# Patient Record
Sex: MALE | Race: NATIVE HAWAIIAN OR OTHER PACIFIC ISLANDER | Employment: FULL TIME | URBAN - NONMETROPOLITAN AREA
[De-identification: names, ages, dates, MRNs, and addresses within clinical notes are randomized per-mention and may not be internally consistent; named-entity substitution may affect disease eponyms.]

---

## 2017-05-26 ENCOUNTER — HOSPITAL ENCOUNTER (OUTPATIENT)
Age: 47
Discharge: HOME | End: 2017-05-26
Payer: COMMERCIAL

## 2017-05-26 DIAGNOSIS — E11.00 UNCONTROLLED TYPE 2 DIABETES MELLITUS WITH HYPEROSMOLARITY WITHOUT COMA, UNSPECIFIED LONG TERM INSULIN USE STATUS: ICD-10-CM

## 2017-05-26 DIAGNOSIS — Z12.5 SCREENING PSA (PROSTATE SPECIFIC ANTIGEN): ICD-10-CM

## 2017-05-26 DIAGNOSIS — Z13.220 SCREENING CHOLESTEROL LEVEL: ICD-10-CM

## 2017-05-26 LAB
ALBUMIN SERPL-MCNC: 4.2 G/DL (ref 3.5–5.2)
ALBUMIN/GLOBULIN RATIO: 1.4 (ref 1–2.5)
ALP BLD-CCNC: 52 U/L (ref 40–129)
ALT SERPL-CCNC: 17 U/L (ref 5–41)
ANION GAP SERPL CALCULATED.3IONS-SCNC: 14 MMOL/L (ref 9–17)
AST SERPL-CCNC: 13 U/L
BILIRUB SERPL-MCNC: 0.43 MG/DL (ref 0.3–1.2)
BUN BLDV-MCNC: 16 MG/DL (ref 6–20)
BUN/CREAT BLD: 31 (ref 9–20)
CALCIUM SERPL-MCNC: 8.9 MG/DL (ref 8.6–10.4)
CHLORIDE BLD-SCNC: 101 MMOL/L (ref 98–107)
CHOLESTEROL/HDL RATIO: 3.7
CHOLESTEROL: 189 MG/DL
CO2: 23 MMOL/L (ref 20–31)
CREAT SERPL-MCNC: 0.52 MG/DL (ref 0.7–1.2)
CREATININE URINE: 125.3 MG/DL (ref 39–259)
ESTIMATED AVERAGE GLUCOSE: 131 MG/DL
GFR AFRICAN AMERICAN: >60 ML/MIN
GFR NON-AFRICAN AMERICAN: >60 ML/MIN
GFR SERPL CREATININE-BSD FRML MDRD: ABNORMAL ML/MIN/{1.73_M2}
GFR SERPL CREATININE-BSD FRML MDRD: ABNORMAL ML/MIN/{1.73_M2}
GLUCOSE BLD-MCNC: 89 MG/DL (ref 70–99)
HBA1C MFR BLD: 6.2 % (ref 4.8–5.9)
HCT VFR BLD CALC: 45.3 % (ref 41–53)
HDLC SERPL-MCNC: 51 MG/DL
HEMOGLOBIN: 15.3 G/DL (ref 13.5–17)
LDL CHOLESTEROL: 108 MG/DL (ref 0–130)
MCH RBC QN AUTO: 29.7 PG (ref 26–34)
MCHC RBC AUTO-ENTMCNC: 33.8 G/DL (ref 31–37)
MCV RBC AUTO: 87.9 FL (ref 80–100)
MICROALBUMIN/CREAT 24H UR: <12 MG/L
MICROALBUMIN/CREAT UR-RTO: 10 MCG/MG CREAT
PDW BLD-RTO: 14 % (ref 12.1–15.2)
PLATELET # BLD: 251 K/UL (ref 140–450)
PMV BLD AUTO: ABNORMAL FL (ref 6–12)
POTASSIUM SERPL-SCNC: 4 MMOL/L (ref 3.7–5.3)
PROSTATE SPECIFIC ANTIGEN: 0.39 UG/L
RBC # BLD: 5.16 M/UL (ref 4.5–5.9)
SODIUM BLD-SCNC: 138 MMOL/L (ref 135–144)
TOTAL PROTEIN: 7.2 G/DL (ref 6.4–8.3)
TRIGL SERPL-MCNC: 148 MG/DL
VLDLC SERPL CALC-MCNC: NORMAL MG/DL (ref 1–30)
WBC # BLD: 11.2 K/UL (ref 3.5–11)

## 2017-05-26 PROCEDURE — 82570 ASSAY OF URINE CREATININE: CPT

## 2017-05-26 PROCEDURE — 80053 COMPREHEN METABOLIC PANEL: CPT

## 2017-05-26 PROCEDURE — 80061 LIPID PANEL: CPT

## 2017-05-26 PROCEDURE — 36415 COLL VENOUS BLD VENIPUNCTURE: CPT

## 2017-05-26 PROCEDURE — 83036 HEMOGLOBIN GLYCOSYLATED A1C: CPT

## 2017-05-26 PROCEDURE — 82043 UR ALBUMIN QUANTITATIVE: CPT

## 2017-05-26 PROCEDURE — 85027 COMPLETE CBC AUTOMATED: CPT

## 2017-05-26 PROCEDURE — G0103 PSA SCREENING: HCPCS

## 2018-02-12 ENCOUNTER — OFFICE VISIT (OUTPATIENT)
Dept: FAMILY MEDICINE CLINIC | Age: 48
End: 2018-02-12
Payer: COMMERCIAL

## 2018-02-12 VITALS
TEMPERATURE: 102.7 F | SYSTOLIC BLOOD PRESSURE: 136 MMHG | WEIGHT: 315 LBS | DIASTOLIC BLOOD PRESSURE: 72 MMHG | BODY MASS INDEX: 44.1 KG/M2 | HEIGHT: 71 IN

## 2018-02-12 DIAGNOSIS — M25.552 PAIN OF LEFT HIP JOINT: ICD-10-CM

## 2018-02-12 DIAGNOSIS — R50.9 FEVER, UNSPECIFIED FEVER CAUSE: Primary | ICD-10-CM

## 2018-02-12 LAB
AVERAGE GLUCOSE: 154
BACTERIA URINE, POC: 0
BASOPHILS ABSOLUTE: 0.1 /ΜL
BASOPHILS RELATIVE PERCENT: 0.8 %
BILIRUBIN URINE: 0 MG/DL
BLOOD, URINE: NEGATIVE
BUN BLDV-MCNC: 10 MG/DL
CALCIUM SERPL-MCNC: 8.8 MG/DL
CASTS URINE, POC: 0
CHLORIDE BLD-SCNC: 91 MMOL/L
CLARITY: CLEAR
CO2: 23 MMOL/L
COLOR: YELLOW
CREAT SERPL-MCNC: 0.8 MG/DL
CRYSTALS URINE, POC: 0
EOSINOPHILS ABSOLUTE: 0 /ΜL
EOSINOPHILS RELATIVE PERCENT: 0.2 %
EPI CELLS URINE, POC: NORMAL
GFR CALCULATED: NORMAL
GLUCOSE BLD-MCNC: 116 MG/DL
GLUCOSE URINE: NORMAL
HBA1C MFR BLD: 7 %
HCT VFR BLD CALC: 43.4 % (ref 41–53)
HEMOGLOBIN: 15.3 G/DL (ref 13.5–17.5)
INFLUENZA A ANTIBODY: NORMAL
INFLUENZA B ANTIBODY: NORMAL
KETONES, URINE: POSITIVE
LEUKOCYTE EST, POC: NORMAL
LYMPHOCYTES ABSOLUTE: 1 /ΜL
LYMPHOCYTES RELATIVE PERCENT: 16.5 %
MCH RBC QN AUTO: 28.9 PG
MCHC RBC AUTO-ENTMCNC: 35.3 G/DL
MCV RBC AUTO: 81.9 FL
MONOCYTES ABSOLUTE: 0.7 /ΜL
MONOCYTES RELATIVE PERCENT: 11.2 %
NEUTROPHILS ABSOLUTE: 4.3 /ΜL
NEUTROPHILS RELATIVE PERCENT: 70.2 %
NITRITE, URINE: NEGATIVE
PDW BLD-RTO: NORMAL %
PH UA: 5 (ref 4.5–8)
PLATELET # BLD: 160 K/ΜL
PMV BLD AUTO: NORMAL FL
POTASSIUM SERPL-SCNC: 3.8 MMOL/L
PROTEIN UA: NEGATIVE
RBC # BLD: 5.3 10^6/ΜL
RBC URINE, POC: 0
SODIUM BLD-SCNC: 132 MMOL/L
SPECIFIC GRAVITY UA: 1 (ref 1–1.03)
UROBILINOGEN, URINE: NORMAL
WBC # BLD: 6.2 10^3/ML
WBC URINE, POC: 0
YEAST URINE, POC: 0

## 2018-02-12 PROCEDURE — 87804 INFLUENZA ASSAY W/OPTIC: CPT | Performed by: FAMILY MEDICINE

## 2018-02-12 PROCEDURE — 81000 URINALYSIS NONAUTO W/SCOPE: CPT | Performed by: FAMILY MEDICINE

## 2018-02-12 PROCEDURE — 99213 OFFICE O/P EST LOW 20 MIN: CPT | Performed by: FAMILY MEDICINE

## 2018-02-12 NOTE — PROGRESS NOTES
TM's.  Nose: nares patent, no lesions. Oral Cavity: mucosa moist.  Throat: palatal erythema  Neck/Thyroid: neck supple, full range of motion, no cervical lymphadenopathy, no thyromegaly or carotid bruits. Skin: warm and dry. No suspicious lesions. Heart: regular rate and rhythm. No murmurs. S1, S2 normal, no gallops. Lungs: clear to auscultation bilaterally. Abdomen: bowel sounds present, soft, nondistended, no masses or organomegaly, morbidly obese, tender mid right abdomen  Musculoskeletal: left pelvic brim tender  Extremities: no cyanosis or edema. Peripheral Pulses: 2+ throughout, symetric. Neurologic: nonfocal, motor strength normal upper and lower extremities, sensory exam intact. Psych: normal affect, speech fluent. ASSESSMENT:  No diagnosis found. PLAN:  We discussed his diabetes, his A1C was 11.3 when I first started seeing him in 2015 and we brought it down to 6. Now he has stopped all his medications. I am unsure of the cause of his fever. I will swab him for influenza and run a urine specimen. No orders of the defined types were placed in this encounter. No orders of the defined types were placed in this encounter. Scribed by: MISTY Mckeon

## 2018-02-13 DIAGNOSIS — I10 ESSENTIAL HYPERTENSION: ICD-10-CM

## 2018-02-13 LAB
ABSOLUTE BASO #: 0.1 K/UL (ref 0–0.1)
ABSOLUTE EOS #: 0 K/UL (ref 0.1–0.4)
ABSOLUTE LYMPH #: 1 K/UL (ref 0.8–5.2)
ABSOLUTE MONO #: 0.7 K/UL (ref 0.1–0.9)
ABSOLUTE NEUT #: 4.3 K/UL (ref 1.3–9.1)
ANION GAP SERPL CALCULATED.3IONS-SCNC: 18 MEQ/L (ref 10–19)
AVERAGE GLUCOSE: 154 MG/DL (ref 66–114)
BASOPHILS RELATIVE PERCENT: 0.8 %
BUN BLDV-MCNC: 10 MG/DL (ref 8–23)
CALCIUM SERPL-MCNC: 8.8 MG/DL (ref 8.5–10.5)
CHLORIDE BLD-SCNC: 91 MEQ/L (ref 95–107)
CO2: 23 MEQ/L (ref 19–31)
CREAT SERPL-MCNC: 0.8 MG/DL (ref 0.8–1.4)
EGFR AFRICAN AMERICAN: 121.6 ML/MIN/1.73 M2
EGFR IF NONAFRICAN AMERICAN: 104.9 ML/MIN/1.73 M2
EOSINOPHILS RELATIVE PERCENT: 0.2 %
GLUCOSE: 116 MG/DL (ref 70–99)
HBA1C MFR BLD: 7 % (ref 4.2–5.8)
HCT VFR BLD CALC: 43.4 % (ref 41.4–51)
HEMOGLOBIN: 15.3 G/DL (ref 13.8–17)
LYMPHOCYTE %: 16.5 %
MCH RBC QN AUTO: 28.9 PG (ref 27–34)
MCHC RBC AUTO-ENTMCNC: 35.3 G/DL (ref 31–36)
MCV RBC AUTO: 81.9 FL (ref 80–100)
MONOCYTES # BLD: 11.2 %
NEUTROPHILS RELATIVE PERCENT: 70.2 %
PDW BLD-RTO: 12.2 % (ref 10.8–14.8)
PLATELETS: 160 K/UL (ref 150–450)
POTASSIUM SERPL-SCNC: 3.8 MEQ/L (ref 3.5–5.4)
RBC: 5.3 M/UL (ref 4–5.5)
SODIUM BLD-SCNC: 132 MEQ/L (ref 135–146)
WBC: 6.2 K/UL (ref 3.7–10.8)

## 2018-02-13 RX ORDER — LISINOPRIL 5 MG/1
TABLET ORAL
Qty: 90 TABLET | Refills: 3 | Status: SHIPPED | OUTPATIENT
Start: 2018-02-13 | End: 2020-05-26 | Stop reason: SDUPTHER

## 2018-02-14 LAB — PROCALCITONIN: 0.22 NG/ML

## 2018-02-28 DIAGNOSIS — R50.9 FEVER, UNSPECIFIED FEVER CAUSE: ICD-10-CM

## 2019-03-05 ENCOUNTER — OFFICE VISIT (OUTPATIENT)
Dept: FAMILY MEDICINE CLINIC | Age: 49
End: 2019-03-05
Payer: COMMERCIAL

## 2019-03-05 ENCOUNTER — HOSPITAL ENCOUNTER (OUTPATIENT)
Age: 49
Discharge: HOME | End: 2019-03-07
Payer: COMMERCIAL

## 2019-03-05 ENCOUNTER — HOSPITAL ENCOUNTER (OUTPATIENT)
Age: 49
Discharge: HOME | End: 2019-03-05
Payer: COMMERCIAL

## 2019-03-05 ENCOUNTER — HOSPITAL ENCOUNTER (OUTPATIENT)
Dept: GENERAL RADIOLOGY | Age: 49
Discharge: HOME | End: 2019-03-07
Payer: COMMERCIAL

## 2019-03-05 ENCOUNTER — TELEPHONE (OUTPATIENT)
Dept: FAMILY MEDICINE CLINIC | Age: 49
End: 2019-03-05

## 2019-03-05 VITALS
HEIGHT: 71 IN | DIASTOLIC BLOOD PRESSURE: 86 MMHG | WEIGHT: 315 LBS | BODY MASS INDEX: 44.1 KG/M2 | SYSTOLIC BLOOD PRESSURE: 138 MMHG

## 2019-03-05 DIAGNOSIS — E11.65 UNCONTROLLED TYPE 2 DIABETES MELLITUS WITH HYPERGLYCEMIA (HCC): ICD-10-CM

## 2019-03-05 DIAGNOSIS — M17.12 OSTEOARTHRITIS OF LEFT KNEE, UNSPECIFIED OSTEOARTHRITIS TYPE: ICD-10-CM

## 2019-03-05 DIAGNOSIS — E66.01 MORBID OBESITY (HCC): ICD-10-CM

## 2019-03-05 DIAGNOSIS — E11.00 DM HYPEROSMOLARITY TYPE II, UNCONTROLLED (HCC): Primary | ICD-10-CM

## 2019-03-05 DIAGNOSIS — I10 ESSENTIAL HYPERTENSION: ICD-10-CM

## 2019-03-05 DIAGNOSIS — E11.65 DM HYPEROSMOLARITY TYPE II, UNCONTROLLED (HCC): Primary | ICD-10-CM

## 2019-03-05 DIAGNOSIS — Z12.5 SCREENING PSA (PROSTATE SPECIFIC ANTIGEN): ICD-10-CM

## 2019-03-05 DIAGNOSIS — M25.562 LEFT KNEE PAIN, UNSPECIFIED CHRONICITY: ICD-10-CM

## 2019-03-05 DIAGNOSIS — E78.5 HYPERLIPIDEMIA, UNSPECIFIED HYPERLIPIDEMIA TYPE: ICD-10-CM

## 2019-03-05 DIAGNOSIS — Z00.00 ROUTINE GENERAL MEDICAL EXAMINATION AT A HEALTH CARE FACILITY: Primary | ICD-10-CM

## 2019-03-05 LAB
ALT SERPL-CCNC: 29 U/L (ref 5–41)
ANION GAP SERPL CALCULATED.3IONS-SCNC: 13 MMOL/L (ref 9–17)
AST SERPL-CCNC: 16 U/L
BUN BLDV-MCNC: 10 MG/DL (ref 6–20)
BUN/CREAT BLD: 17 (ref 9–20)
CALCIUM SERPL-MCNC: 9.3 MG/DL (ref 8.6–10.4)
CHLORIDE BLD-SCNC: 101 MMOL/L (ref 98–107)
CHOLESTEROL/HDL RATIO: 3.5
CHOLESTEROL: 171 MG/DL
CO2: 24 MMOL/L (ref 20–31)
CREAT SERPL-MCNC: 0.6 MG/DL (ref 0.7–1.2)
ESTIMATED AVERAGE GLUCOSE: 131 MG/DL
GFR AFRICAN AMERICAN: >60 ML/MIN
GFR NON-AFRICAN AMERICAN: >60 ML/MIN
GFR SERPL CREATININE-BSD FRML MDRD: ABNORMAL ML/MIN/{1.73_M2}
GFR SERPL CREATININE-BSD FRML MDRD: ABNORMAL ML/MIN/{1.73_M2}
GLUCOSE BLD-MCNC: 101 MG/DL (ref 70–99)
HBA1C MFR BLD: 6.2 % (ref 4.8–5.9)
HCT VFR BLD CALC: 46.8 % (ref 40.7–50.3)
HDLC SERPL-MCNC: 49 MG/DL
HEMOGLOBIN: 15 G/DL (ref 13–17)
HIGH SENSITIVE C-REACTIVE PROTEIN: 11.4 MG/L
LDL CHOLESTEROL: 101 MG/DL (ref 0–130)
MCH RBC QN AUTO: 29.2 PG (ref 25.2–33.5)
MCHC RBC AUTO-ENTMCNC: 32.1 G/DL (ref 28.4–34.8)
MCV RBC AUTO: 91.2 FL (ref 82.6–102.9)
NRBC AUTOMATED: 0 PER 100 WBC
PDW BLD-RTO: 12.8 % (ref 11.8–14.4)
PLATELET # BLD: 268 K/UL (ref 138–453)
PMV BLD AUTO: 9.5 FL (ref 8.1–13.5)
POTASSIUM SERPL-SCNC: 4.1 MMOL/L (ref 3.7–5.3)
RBC # BLD: 5.13 M/UL (ref 4.21–5.77)
SODIUM BLD-SCNC: 138 MMOL/L (ref 135–144)
TRIGL SERPL-MCNC: 106 MG/DL
VLDLC SERPL CALC-MCNC: NORMAL MG/DL (ref 1–30)
WBC # BLD: 12.6 K/UL (ref 3.5–11.3)

## 2019-03-05 PROCEDURE — 99396 PREV VISIT EST AGE 40-64: CPT | Performed by: FAMILY MEDICINE

## 2019-03-05 PROCEDURE — 80061 LIPID PANEL: CPT

## 2019-03-05 PROCEDURE — 84460 ALANINE AMINO (ALT) (SGPT): CPT

## 2019-03-05 PROCEDURE — 84450 TRANSFERASE (AST) (SGOT): CPT

## 2019-03-05 PROCEDURE — 73560 X-RAY EXAM OF KNEE 1 OR 2: CPT

## 2019-03-05 PROCEDURE — 80048 BASIC METABOLIC PNL TOTAL CA: CPT

## 2019-03-05 PROCEDURE — 36415 COLL VENOUS BLD VENIPUNCTURE: CPT

## 2019-03-05 PROCEDURE — 83036 HEMOGLOBIN GLYCOSYLATED A1C: CPT

## 2019-03-05 PROCEDURE — 86141 C-REACTIVE PROTEIN HS: CPT

## 2019-03-05 PROCEDURE — 85027 COMPLETE CBC AUTOMATED: CPT

## 2019-03-05 ASSESSMENT — PATIENT HEALTH QUESTIONNAIRE - PHQ9
SUM OF ALL RESPONSES TO PHQ9 QUESTIONS 1 & 2: 0
SUM OF ALL RESPONSES TO PHQ QUESTIONS 1-9: 0
SUM OF ALL RESPONSES TO PHQ QUESTIONS 1-9: 0
2. FEELING DOWN, DEPRESSED OR HOPELESS: 0
1. LITTLE INTEREST OR PLEASURE IN DOING THINGS: 0

## 2019-05-09 ENCOUNTER — HOSPITAL ENCOUNTER (OUTPATIENT)
Dept: GENERAL RADIOLOGY | Age: 49
Discharge: HOME | End: 2019-05-11
Payer: COMMERCIAL

## 2019-05-09 ENCOUNTER — HOSPITAL ENCOUNTER (OUTPATIENT)
Age: 49
Discharge: HOME | End: 2019-05-11
Payer: COMMERCIAL

## 2019-05-09 ENCOUNTER — HOSPITAL ENCOUNTER (OUTPATIENT)
Age: 49
Discharge: HOME | End: 2019-05-09
Payer: COMMERCIAL

## 2019-05-09 DIAGNOSIS — Z01.811 PRE-OP CHEST EXAM: ICD-10-CM

## 2019-05-09 LAB
ABSOLUTE EOS #: 0.16 K/UL (ref 0–0.44)
ABSOLUTE IMMATURE GRANULOCYTE: 0.08 K/UL (ref 0–0.3)
ABSOLUTE LYMPH #: 2.76 K/UL (ref 1.1–3.7)
ABSOLUTE MONO #: 0.97 K/UL (ref 0.1–1.2)
ANION GAP SERPL CALCULATED.3IONS-SCNC: 12 MMOL/L (ref 9–17)
BASOPHILS # BLD: 1 % (ref 0–2)
BASOPHILS ABSOLUTE: 0.1 K/UL (ref 0–0.2)
BUN BLDV-MCNC: 11 MG/DL (ref 6–20)
BUN/CREAT BLD: 18 (ref 9–20)
CALCIUM SERPL-MCNC: 9.2 MG/DL (ref 8.6–10.4)
CHLORIDE BLD-SCNC: 100 MMOL/L (ref 98–107)
CO2: 26 MMOL/L (ref 20–31)
CREAT SERPL-MCNC: 0.6 MG/DL (ref 0.7–1.2)
DIFFERENTIAL TYPE: ABNORMAL
EKG ATRIAL RATE: 85 BPM
EKG P AXIS: 65 DEGREES
EKG P-R INTERVAL: 144 MS
EKG Q-T INTERVAL: 382 MS
EKG QRS DURATION: 96 MS
EKG QTC CALCULATION (BAZETT): 454 MS
EKG R AXIS: -20 DEGREES
EKG T AXIS: 30 DEGREES
EKG VENTRICULAR RATE: 85 BPM
EOSINOPHILS RELATIVE PERCENT: 1 % (ref 1–4)
GFR AFRICAN AMERICAN: >60 ML/MIN
GFR NON-AFRICAN AMERICAN: >60 ML/MIN
GFR SERPL CREATININE-BSD FRML MDRD: ABNORMAL ML/MIN/{1.73_M2}
GFR SERPL CREATININE-BSD FRML MDRD: ABNORMAL ML/MIN/{1.73_M2}
GLUCOSE BLD-MCNC: 122 MG/DL (ref 70–99)
HCT VFR BLD CALC: 46.4 % (ref 40.7–50.3)
HEMOGLOBIN: 15.4 G/DL (ref 13–17)
IMMATURE GRANULOCYTES: 1 %
LYMPHOCYTES # BLD: 20 % (ref 24–43)
MCH RBC QN AUTO: 29.1 PG (ref 25.2–33.5)
MCHC RBC AUTO-ENTMCNC: 33.2 G/DL (ref 28.4–34.8)
MCV RBC AUTO: 87.5 FL (ref 82.6–102.9)
MONOCYTES # BLD: 7 % (ref 3–12)
NRBC AUTOMATED: 0 PER 100 WBC
PDW BLD-RTO: 13.1 % (ref 11.8–14.4)
PLATELET # BLD: 274 K/UL (ref 138–453)
PLATELET ESTIMATE: ABNORMAL
PMV BLD AUTO: 8.9 FL (ref 8.1–13.5)
POTASSIUM SERPL-SCNC: 4.5 MMOL/L (ref 3.7–5.3)
RBC # BLD: 5.3 M/UL (ref 4.21–5.77)
RBC # BLD: ABNORMAL 10*6/UL
SEG NEUTROPHILS: 70 % (ref 36–65)
SEGMENTED NEUTROPHILS ABSOLUTE COUNT: 9.59 K/UL (ref 1.5–8.1)
SODIUM BLD-SCNC: 138 MMOL/L (ref 135–144)
WBC # BLD: 13.7 K/UL (ref 3.5–11.3)
WBC # BLD: ABNORMAL 10*3/UL

## 2019-05-09 PROCEDURE — 71046 X-RAY EXAM CHEST 2 VIEWS: CPT

## 2019-05-09 PROCEDURE — 85025 COMPLETE CBC W/AUTO DIFF WBC: CPT

## 2019-05-09 PROCEDURE — 93005 ELECTROCARDIOGRAM TRACING: CPT

## 2019-05-09 PROCEDURE — 80048 BASIC METABOLIC PNL TOTAL CA: CPT

## 2019-05-09 PROCEDURE — 36415 COLL VENOUS BLD VENIPUNCTURE: CPT

## 2019-07-16 ENCOUNTER — HOSPITAL ENCOUNTER (OUTPATIENT)
Age: 49
Discharge: HOME | End: 2019-07-16
Payer: COMMERCIAL

## 2019-07-16 ENCOUNTER — TELEPHONE (OUTPATIENT)
Dept: GASTROENTEROLOGY | Age: 49
End: 2019-07-16

## 2019-07-16 ENCOUNTER — OFFICE VISIT (OUTPATIENT)
Dept: FAMILY MEDICINE CLINIC | Age: 49
End: 2019-07-16
Payer: COMMERCIAL

## 2019-07-16 VITALS
BODY MASS INDEX: 44.1 KG/M2 | HEIGHT: 71 IN | SYSTOLIC BLOOD PRESSURE: 136 MMHG | DIASTOLIC BLOOD PRESSURE: 82 MMHG | WEIGHT: 315 LBS

## 2019-07-16 DIAGNOSIS — M17.12 OSTEOARTHRITIS OF LEFT KNEE, UNSPECIFIED OSTEOARTHRITIS TYPE: ICD-10-CM

## 2019-07-16 DIAGNOSIS — E11.00 DM HYPEROSMOLARITY TYPE II, UNCONTROLLED (HCC): ICD-10-CM

## 2019-07-16 DIAGNOSIS — E66.01 MORBID OBESITY (HCC): ICD-10-CM

## 2019-07-16 DIAGNOSIS — B07.9 FILIFORM WART: Primary | ICD-10-CM

## 2019-07-16 DIAGNOSIS — F41.9 INSOMNIA SECONDARY TO ANXIETY: ICD-10-CM

## 2019-07-16 DIAGNOSIS — F41.9 ANXIETY: ICD-10-CM

## 2019-07-16 DIAGNOSIS — Z12.5 SCREENING PSA (PROSTATE SPECIFIC ANTIGEN): ICD-10-CM

## 2019-07-16 DIAGNOSIS — E78.5 HYPERLIPIDEMIA, UNSPECIFIED HYPERLIPIDEMIA TYPE: ICD-10-CM

## 2019-07-16 DIAGNOSIS — F51.05 INSOMNIA SECONDARY TO ANXIETY: ICD-10-CM

## 2019-07-16 DIAGNOSIS — E11.65 DM HYPEROSMOLARITY TYPE II, UNCONTROLLED (HCC): ICD-10-CM

## 2019-07-16 DIAGNOSIS — E11.9 TYPE 2 DIABETES MELLITUS WITHOUT COMPLICATION, UNSPECIFIED WHETHER LONG TERM INSULIN USE (HCC): ICD-10-CM

## 2019-07-16 LAB
ANION GAP SERPL CALCULATED.3IONS-SCNC: 12 MMOL/L (ref 9–17)
BUN BLDV-MCNC: 12 MG/DL (ref 6–20)
BUN/CREAT BLD: 18 (ref 9–20)
CALCIUM SERPL-MCNC: 9 MG/DL (ref 8.6–10.4)
CHLORIDE BLD-SCNC: 104 MMOL/L (ref 98–107)
CO2: 24 MMOL/L (ref 20–31)
CREAT SERPL-MCNC: 0.67 MG/DL (ref 0.7–1.2)
ESTIMATED AVERAGE GLUCOSE: 137 MG/DL
GFR AFRICAN AMERICAN: >60 ML/MIN
GFR NON-AFRICAN AMERICAN: >60 ML/MIN
GFR SERPL CREATININE-BSD FRML MDRD: ABNORMAL ML/MIN/{1.73_M2}
GFR SERPL CREATININE-BSD FRML MDRD: ABNORMAL ML/MIN/{1.73_M2}
GLUCOSE BLD-MCNC: 103 MG/DL (ref 70–99)
HBA1C MFR BLD: 6.4 % (ref 4.8–5.9)
HBA1C MFR BLD: 6.5 %
POTASSIUM SERPL-SCNC: 4.5 MMOL/L (ref 3.7–5.3)
PROSTATE SPECIFIC ANTIGEN: 0.63 UG/L
SODIUM BLD-SCNC: 140 MMOL/L (ref 135–144)

## 2019-07-16 PROCEDURE — 80048 BASIC METABOLIC PNL TOTAL CA: CPT

## 2019-07-16 PROCEDURE — 83036 HEMOGLOBIN GLYCOSYLATED A1C: CPT

## 2019-07-16 PROCEDURE — 36415 COLL VENOUS BLD VENIPUNCTURE: CPT

## 2019-07-16 PROCEDURE — G0103 PSA SCREENING: HCPCS

## 2019-07-16 PROCEDURE — 99214 OFFICE O/P EST MOD 30 MIN: CPT | Performed by: FAMILY MEDICINE

## 2019-07-16 PROCEDURE — 83036 HEMOGLOBIN GLYCOSYLATED A1C: CPT | Performed by: FAMILY MEDICINE

## 2019-07-16 PROCEDURE — 17110 DESTRUCTION B9 LES UP TO 14: CPT | Performed by: FAMILY MEDICINE

## 2019-07-16 RX ORDER — TRAZODONE HYDROCHLORIDE 50 MG/1
TABLET ORAL
Qty: 90 TABLET | Refills: 0 | Status: SHIPPED | OUTPATIENT
Start: 2019-07-16 | End: 2020-05-26 | Stop reason: ALTCHOICE

## 2019-07-16 RX ORDER — METFORMIN HYDROCHLORIDE 500 MG/1
1000 TABLET, EXTENDED RELEASE ORAL
Qty: 180 TABLET | Refills: 3 | Status: SHIPPED | OUTPATIENT
Start: 2019-07-16 | End: 2020-05-26 | Stop reason: SDUPTHER

## 2019-07-16 RX ORDER — ROSUVASTATIN CALCIUM 5 MG/1
5 TABLET, COATED ORAL DAILY
Qty: 90 TABLET | Refills: 3 | Status: SHIPPED | OUTPATIENT
Start: 2019-07-16 | End: 2020-05-26 | Stop reason: SDUPTHER

## 2019-07-16 NOTE — PROGRESS NOTES
strip TEST 2 TIMES DAILY. Patient not taking: Reported on 7/16/2019 7/8/16   Isabel Ware MD   Blood Glucose Monitoring Suppl (TRUE METRIX AIR GLUCOSE METER) W/DEVICE KIT 1 Device by Does not apply route 2 times daily  Patient not taking: Reported on 7/16/2019 7/8/16   Isabel Ware MD     ROS:  General Constitutional: Denies chills. Denies fever. Denies headache. Denies lightheadedness. Ophthalmologic: Denies blurred vision. ENT: Denies nasal congestion. Denies sore throat. Denies ear pain and pressure. Respiratory: Denies cough. Denies shortness of breath. Denies wheezing. Cardiovascular: Denies chest pain at rest. Denies irregular heartbeat. Denies palpitations. Gastrointestinal: Denies abdominal pain. Denies blood in the stool. Denies constipation. Denies diarrhea. Denies nausea. Denies vomiting. Genitourinary: Denies blood in the urine. Denies difficulty urinating. Denies frequent urination. Denies painful urination. Denies urinary incontinence. Musculoskeletal: Denies muscle aches. Denies painful joints. Denies swollen joints. Admits L knee pain, saw an orthopedic and was told he'd need a replacement 3 years or lose 150 #  Peripheral Vascular: Denies pain/cramping in legs after exertion. Skin: Denies dry skin. Denies itching. Denies rash. Neurologic: Denies falls. Denies dizziness. Denies fainting. Denies tingling/numbness. Psychiatric: Denies depressed mood. Admits troubles falling asleep, takes 2-3 hours to fall asleep. Admits anxiety, has troubles shutting his mind off, states he's a worry wart. No past surgical history on file.     Family History   Problem Relation Age of Onset    High Blood Pressure Mother     High Cholesterol Mother     Emphysema Mother     Stroke Mother     Diabetes Father 48    Cancer Father     Other Sister         Smoker, chronic cough, SOB, 300+ lbs    Diabetes Paternal Uncle         Type 1    Cancer Paternal Uncle         Prostate    Heart Attack Paternal Last 3 Encounters:   07/16/19 136/82   03/05/19 138/86   02/12/18 136/72     General Appearance: in no acute distress, well developed, well nourished. Eyes: pupils equal, round reactive to light and accommodation. Ears: normal canal and TM's. Nose: nares patent, no lesions. Oral Cavity: mucosa moist.  Throat: clear. narrow  Neck/Thyroid: neck supple, full range of motion, no cervical lymphadenopathy, no thyromegaly or carotid bruits. Skin: warm and dry. No suspicious lesions. Hemosiderin deposits, Filiform wart L ear canal  Heart: regular rate and rhythm. No murmurs. S1, S2 normal, no gallops. Rate 65  Lungs: inspiratory and expiratory wheeze LLL  Abdomen: bowel sounds present, soft, nontender, nondistended, no masses or organomegaly. Morbidly obese  Musculoskeletal: normal, full range of motion in knees and hips, no swelling or tenderness. Extremities: no cyanosis, 1+ edema BLE  Peripheral Pulses: 2+ throughout, symetric. Neurologic: nonfocal, motor strength normal upper and lower extremities, sensory exam intact. Psych: normal affect, speech fluent. ASSESSMENT:   Diagnosis Orders   1. Filiform wart  80447 - ND DESTRUC PREMALIGNANT, FIRST LESION   2. Insomnia secondary to anxiety     3. Osteoarthritis of left knee, unspecified osteoarthritis type     4. Type 2 diabetes mellitus without complication, unspecified whether long term insulin use (HCC)  POCT glycosylated hemoglobin (Hb A1C)   5. Hyperlipidemia, unspecified hyperlipidemia type     6. Morbid obesity ([address]     7. Anxiety         PLAN:  I would like for him to start taking Metformin XR 1,000 mg once daily. With having diabetes this gives him the same risk of having a heart attack or stroke as a patient with known heart disease. I will start him on 5 mg Rosuvastatin. I will check an A1C today in office. I also will start him on Trazodone 50 mg for 2 weeks before bed and then increase to 100 mg. He states his goal is to get under 300 lbs.

## 2019-07-17 NOTE — TELEPHONE ENCOUNTER
What is the indication for an EGD on this patient?
at bedtime for 2 weeks, then increase to 2 tablets  Oswaldo Travis MD   diclofenac sodium 1 % GEL Apply 2 g topically 2 times daily  Oswaldo Travis MD   lisinopril (PRINIVIL;ZESTRIL) 5 MG tablet TAKE 1 TABLET BY MOUTH DAILY  Oswaldo Travis MD   glimepiride (AMARYL) 1 MG tablet 1/2 tablet twice a day  Patient not taking: Reported on 7/16/2019  Oswaldo Travis MD   [employer] Lancets Ultra Thin MISC USE TO TEST 3 TIMES A DAY  Patient not taking: Reported on 7/16/2019  Oswaldo Travis MD   glucose blood VI test strips (TRUE METRIX BLOOD GLUCOSE TEST) strip TEST 2 TIMES DAILY.   Patient not taking: Reported on 7/16/2019  Oswaldo Travis MD   Blood Glucose Monitoring Suppl (TRUE METRIX AIR GLUCOSE METER) W/DEVICE KIT 1 Device by Does not apply route 2 times daily  Patient not taking: Reported on 7/16/2019  Oswaldo Travis MD         Electronically signed by Oswaldo Travis MD on 7/16/19 at 8:40 AM

## 2020-05-26 ENCOUNTER — OFFICE VISIT (OUTPATIENT)
Dept: FAMILY MEDICINE CLINIC | Age: 50
End: 2020-05-26
Payer: COMMERCIAL

## 2020-05-26 VITALS — WEIGHT: 315 LBS | HEIGHT: 71 IN | BODY MASS INDEX: 44.1 KG/M2

## 2020-05-26 PROBLEM — M54.16 LUMBAR RADICULOPATHY: Status: ACTIVE | Noted: 2020-05-26

## 2020-05-26 PROCEDURE — 99213 OFFICE O/P EST LOW 20 MIN: CPT | Performed by: FAMILY MEDICINE

## 2020-05-26 RX ORDER — CYCLOBENZAPRINE HCL 10 MG
10 TABLET ORAL NIGHTLY PRN
Qty: 30 TABLET | Refills: 0 | Status: SHIPPED | OUTPATIENT
Start: 2020-05-26 | End: 2020-06-05

## 2020-05-26 RX ORDER — LISINOPRIL 5 MG/1
TABLET ORAL
Qty: 90 TABLET | Refills: 3 | Status: SHIPPED | OUTPATIENT
Start: 2020-05-26 | End: 2021-07-13 | Stop reason: SDUPTHER

## 2020-05-26 RX ORDER — ROSUVASTATIN CALCIUM 5 MG/1
5 TABLET, COATED ORAL DAILY
Qty: 90 TABLET | Refills: 3 | Status: SHIPPED | OUTPATIENT
Start: 2020-05-26 | End: 2021-07-13 | Stop reason: SDUPTHER

## 2020-05-26 RX ORDER — METFORMIN HYDROCHLORIDE 500 MG/1
1000 TABLET, EXTENDED RELEASE ORAL
Qty: 180 TABLET | Refills: 3 | Status: SHIPPED | OUTPATIENT
Start: 2020-05-26 | End: 2021-07-13 | Stop reason: SDUPTHER

## 2020-05-26 NOTE — PROGRESS NOTES
normal canal and TM's. Nose: nares patent, no lesions. Oral Cavity: mucosa moist.  Throat: clear. Neck/Thyroid: neck supple, full range of motion, no cervical lymphadenopathy, no thyromegaly or carotid bruits. Skin: warm and dry. No suspicious lesions. Heart: regular rate and rhythm. No murmurs. S1, S2 normal, no gallops. Lungs: clear to auscultation bilaterally. Abdomen: bowel sounds present, soft, nontender, nondistended, no masses or organomegaly. Morbid obesity  Musculoskeletal: normal, full range of motion in knees and hips, no swelling or tenderness. ROM in the spine with flexion was about 40 degrees, extension to about 15 degrees, lateral rotation and flexion was normal. Positive straight leg test on the RLE. Steady gait. Extremities: no cyanosis or edema. Peripheral Pulses: 2+ throughout, symetric. Neurologic: nonfocal, motor strength normal upper and lower extremities, sensory exam intact. Psych: normal affect, speech fluent. ASSESSMENT:   Diagnosis Orders   1. Lumbar radiculopathy  [facility]    L4   2. Essential hypertension  lisinopril (PRINIVIL;ZESTRIL) 5 MG tablet         PLAN:    Discussed with the patient this is correlating with L4 radiculopathy and that there is no urgency in this with needing diagnostic testing. Educated on the patient that we should have him be off of work for 3 weeks and to go to Physical Therapy during this time to see if there is improvement. Also, discussed that he can take PRN flexeril at night to help with spasms. I would like to see the patient in 3 weeks to determine if there was effectiveness or not with this treatment.      Orders Placed This Encounter   Procedures    [facility]     Referral Priority:   Routine     Referral Type:   Eval and Treat     Referral Reason:   Specialty Services Required     Requested Specialty:   Physical Therapy     Number of Visits Requested:   1     Orders Placed This Encounter

## 2020-05-26 NOTE — PATIENT INSTRUCTIONS
PLAN:    Discussed with the patient this is correlating with L4-5 impingement    SURVEY:    You may be receiving a survey from [employer] regarding your visit today. Please complete the survey to enable us to provide the highest quality of care to you and your family. If you cannot score us a very good on any question, please call the office to discuss how we could have made your experience a very good one. Thank you.

## 2020-05-28 ENCOUNTER — HOSPITAL ENCOUNTER (OUTPATIENT)
Dept: PHYSICAL THERAPY | Age: 50
Setting detail: THERAPIES SERIES
Discharge: HOME | End: 2020-05-28
Payer: COMMERCIAL

## 2020-05-28 PROCEDURE — 97140 MANUAL THERAPY 1/> REGIONS: CPT

## 2020-05-28 PROCEDURE — G0283 ELEC STIM OTHER THAN WOUND: HCPCS

## 2020-05-28 PROCEDURE — 97110 THERAPEUTIC EXERCISES: CPT

## 2020-05-28 PROCEDURE — 97162 PT EVAL MOD COMPLEX 30 MIN: CPT

## 2020-12-03 ENCOUNTER — TELEPHONE (OUTPATIENT)
Dept: FAMILY MEDICINE CLINIC | Age: 50
End: 2020-12-03

## 2020-12-03 ASSESSMENT — PATIENT HEALTH QUESTIONNAIRE - PHQ9
SUM OF ALL RESPONSES TO PHQ9 QUESTIONS 1 & 2: 0
2. FEELING DOWN, DEPRESSED OR HOPELESS: 0
SUM OF ALL RESPONSES TO PHQ QUESTIONS 1-9: 0
1. LITTLE INTEREST OR PLEASURE IN DOING THINGS: 0

## 2020-12-03 NOTE — TELEPHONE ENCOUNTER
PATIENT CALLED IN REGARDS TO CHECKING IN ON MOOD STABILITY AND DEPRESSION. DEPRESSION SCREENING  COMPLETED.

## 2020-12-16 ENCOUNTER — TELEPHONE (OUTPATIENT)
Dept: FAMILY MEDICINE CLINIC | Age: 50
End: 2020-12-16

## 2020-12-16 NOTE — TELEPHONE ENCOUNTER
Pt sent in a message asking about a coivd test. I lm for him to call the office to go over what is going on??

## 2021-01-08 ENCOUNTER — HOSPITAL ENCOUNTER (OUTPATIENT)
Dept: GENERAL RADIOLOGY | Age: 51
Discharge: HOME | End: 2021-01-10
Payer: COMMERCIAL

## 2021-01-08 ENCOUNTER — HOSPITAL ENCOUNTER (OUTPATIENT)
Age: 51
Discharge: HOME | End: 2021-01-10
Payer: COMMERCIAL

## 2021-01-08 DIAGNOSIS — M25.512 LEFT SHOULDER PAIN, UNSPECIFIED CHRONICITY: ICD-10-CM

## 2021-01-08 DIAGNOSIS — M79.622 PAIN OF LEFT UPPER ARM: ICD-10-CM

## 2021-01-08 PROCEDURE — 73030 X-RAY EXAM OF SHOULDER: CPT

## 2021-01-08 PROCEDURE — 73060 X-RAY EXAM OF HUMERUS: CPT

## 2021-01-29 DIAGNOSIS — I10 ESSENTIAL HYPERTENSION: ICD-10-CM

## 2021-01-29 RX ORDER — METFORMIN HYDROCHLORIDE 500 MG/1
1000 TABLET, EXTENDED RELEASE ORAL
Qty: 180 TABLET | Refills: 3 | OUTPATIENT
Start: 2021-01-29

## 2021-01-29 RX ORDER — LISINOPRIL 5 MG/1
TABLET ORAL
Qty: 90 TABLET | Refills: 3 | OUTPATIENT
Start: 2021-01-29

## 2021-01-29 RX ORDER — ROSUVASTATIN CALCIUM 5 MG/1
5 TABLET, COATED ORAL DAILY
Qty: 90 TABLET | Refills: 3 | OUTPATIENT
Start: 2021-01-29

## 2021-02-02 ENCOUNTER — HOSPITAL ENCOUNTER (OUTPATIENT)
Dept: MRI IMAGING | Age: 51
Discharge: HOME | End: 2021-02-04
Payer: COMMERCIAL

## 2021-02-02 DIAGNOSIS — M25.512 LEFT SHOULDER PAIN, UNSPECIFIED CHRONICITY: ICD-10-CM

## 2021-02-15 ENCOUNTER — HOSPITAL ENCOUNTER (OUTPATIENT)
Dept: MRI IMAGING | Age: 51
Discharge: HOME | End: 2021-02-17
Payer: COMMERCIAL

## 2021-02-15 PROCEDURE — 73221 MRI JOINT UPR EXTREM W/O DYE: CPT

## 2021-07-13 ENCOUNTER — OFFICE VISIT (OUTPATIENT)
Dept: FAMILY MEDICINE CLINIC | Age: 51
End: 2021-07-13
Payer: COMMERCIAL

## 2021-07-13 VITALS
SYSTOLIC BLOOD PRESSURE: 158 MMHG | WEIGHT: 315 LBS | OXYGEN SATURATION: 96 % | BODY MASS INDEX: 44.1 KG/M2 | HEIGHT: 71 IN | DIASTOLIC BLOOD PRESSURE: 90 MMHG

## 2021-07-13 DIAGNOSIS — I10 ESSENTIAL HYPERTENSION: ICD-10-CM

## 2021-07-13 DIAGNOSIS — Z12.11 SCREENING FOR COLON CANCER: Primary | ICD-10-CM

## 2021-07-13 DIAGNOSIS — E11.9 TYPE 2 DIABETES MELLITUS WITHOUT COMPLICATION, UNSPECIFIED WHETHER LONG TERM INSULIN USE (HCC): ICD-10-CM

## 2021-07-13 DIAGNOSIS — E66.01 MORBID OBESITY (HCC): ICD-10-CM

## 2021-07-13 DIAGNOSIS — F51.05 INSOMNIA SECONDARY TO ANXIETY: ICD-10-CM

## 2021-07-13 DIAGNOSIS — F41.9 INSOMNIA SECONDARY TO ANXIETY: ICD-10-CM

## 2021-07-13 LAB — HBA1C MFR BLD: 13.5 %

## 2021-07-13 PROCEDURE — 99214 OFFICE O/P EST MOD 30 MIN: CPT | Performed by: FAMILY MEDICINE

## 2021-07-13 PROCEDURE — 83036 HEMOGLOBIN GLYCOSYLATED A1C: CPT | Performed by: FAMILY MEDICINE

## 2021-07-13 RX ORDER — METFORMIN HYDROCHLORIDE 500 MG/1
1000 TABLET, EXTENDED RELEASE ORAL 2 TIMES DAILY
Qty: 360 TABLET | Refills: 3 | Status: SHIPPED | OUTPATIENT
Start: 2021-07-13 | End: 2022-10-17 | Stop reason: SDUPTHER

## 2021-07-13 RX ORDER — TRAMADOL HYDROCHLORIDE 50 MG/1
50 TABLET ORAL EVERY 6 HOURS PRN
COMMUNITY
End: 2022-01-18 | Stop reason: ALTCHOICE

## 2021-07-13 RX ORDER — LISINOPRIL 5 MG/1
TABLET ORAL
Qty: 90 TABLET | Refills: 3 | Status: SHIPPED | OUTPATIENT
Start: 2021-07-13 | End: 2022-05-24 | Stop reason: SDUPTHER

## 2021-07-13 RX ORDER — ROSUVASTATIN CALCIUM 5 MG/1
5 TABLET, COATED ORAL DAILY
Qty: 90 TABLET | Refills: 3 | Status: SHIPPED | OUTPATIENT
Start: 2021-07-13 | End: 2022-05-24 | Stop reason: SDUPTHER

## 2021-07-13 ASSESSMENT — PATIENT HEALTH QUESTIONNAIRE - PHQ9
1. LITTLE INTEREST OR PLEASURE IN DOING THINGS: 0
SUM OF ALL RESPONSES TO PHQ9 QUESTIONS 1 & 2: 0
SUM OF ALL RESPONSES TO PHQ QUESTIONS 1-9: 0
DEPRESSION UNABLE TO ASSESS: PT REFUSES
SUM OF ALL RESPONSES TO PHQ QUESTIONS 1-9: 0
SUM OF ALL RESPONSES TO PHQ QUESTIONS 1-9: 0
2. FEELING DOWN, DEPRESSED OR HOPELESS: 0

## 2021-07-13 NOTE — PATIENT INSTRUCTIONS
PLAN:       A1C in office-13.5. I would like him to go back on metformin 1000 mg twice daily and lisinopril 5 mg daily. We discuss the difficulty falling asleep. He has tried tramadol 5 mg melatonin, and trazodone without success. Given his risk for sleep apnea, I can't give him a sedative as it could increase his risk for stopping breathing. He does drink diet pepsi throughout the day. I recommend that he eliminate his caffeine intake by 1 caffeinated beverage per day, per week until he has discontinued this and then increase the melatonin to 10 mg nightly. He is to get labs done- CBC,  A1C, PSA, Lipids, BMP through [facility] in 1 month. I will see him back in 1 month. SURVEY:    You may be receiving a survey from [employer] regarding your visit today. Please complete the survey to enable us to provide the highest quality of care to you and your family. If you cannot score us a very good on any question, please call the office to discuss how we could of made your experience a very good one. Thank you.

## 2021-07-13 NOTE — PROGRESS NOTES
Apply 2 g topically 2 times daily 3/5/19   Giles Bermudez MD       ROS:  General Constitutional: Denies chills. Denies fever. admits headache. Admits lightheadedness. Ophthalmologic: Denies blurred vision. ENT: Denies nasal congestion. Denies sore throat. Denies ear pain and pressure. Respiratory: Denies cough. Denies shortness of breath. Denies wheezing. Cardiovascular: Denies chest pain at rest. Denies irregular heartbeat. Denies palpitations. Gastrointestinal: Denies abdominal pain. Denies blood in the stool. Admits constipation. Admits diarrhea. Denies nausea. Denies vomiting. Admits to only eating 1 meal a day   Genitourinary: Denies blood in the urine. Denies difficulty urinating. Denies frequent urination. Denies painful urination. Denies urinary incontinence. Musculoskeletal: Denies muscle aches. admits painful joints knees, ankles, hips. Denies swollen joints. Admits aches in rotator cuff   Peripheral Vascular: Denies pain/cramping in legs after exertion. Skin: Denies dry skin. Denies itching. Denies rash. Neurologic: admits falls at work in January also 6 weeks ago fishing slipped had chest pain x3 weeks and admits to hitting head . Admits dizziness with lack of food in system. Denies fainting. admits tingling/numbness in legs   Psychiatric: admits sleep disturbance. admits anxiety. Admits depressed mood. No past surgical history on file.     Family History   Problem Relation Age of Onset    High Blood Pressure Mother     High Cholesterol Mother     Emphysema Mother     Stroke Mother     Diabetes Father 48    Cancer Father     Other Sister         Smoker, chronic cough, SOB, 300+ lbs    Diabetes Paternal Uncle         Type 1    Cancer Paternal Uncle         Prostate    Heart Attack Paternal Grandfather     Diabetes Paternal Grandfather     Cataracts Paternal Grandfather        Past Medical History:   Diagnosis Date    Diabetes [facility] [address] 2015    Hypertension     Obesity     Morbid    Venous insufficiency       Social History     Tobacco Use    Smoking status: Never Smoker    Smokeless tobacco: Never Used   Substance Use Topics    Alcohol use: Yes     Alcohol/week: 0.0 standard drinks     Comment: occ, beer 1-2x/month      Current Outpatient Medications   Medication Sig Dispense Refill    traMADol (ULTRAM) 50 MG tablet Take 50 mg by mouth every 6 hours as needed for Pain.  lisinopril (PRINIVIL;ZESTRIL) 5 MG tablet TAKE 1 TABLET BY MOUTH DAILY 90 tablet 3    metFORMIN (GLUCOPHAGE-XR) 500 MG extended release tablet Take 2 tablets by mouth daily (with breakfast) (Patient not taking: Reported on 7/13/2021) 180 tablet 3    rosuvastatin (CRESTOR) 5 MG tablet Take 1 tablet by mouth daily (Patient not taking: Reported on 7/13/2021) 90 tablet 3    diclofenac sodium 1 % GEL Apply 2 g topically 2 times daily 1 Tube 3     No current facility-administered medications for this visit. No Known Allergies    PHYSICAL EXAM:    BP (!) 158/90   Ht 5' 11\" (1.803 m)   Wt (!) 395 lb (179.2 kg)   SpO2 96%   BMI 55.09 kg/m²   Wt Readings from Last 3 Encounters:   07/13/21 (!) 395 lb (179.2 kg)   05/26/20 (!) 415 lb 9.6 oz (188.5 kg)   07/16/19 (!) 395 lb (179.2 kg)     BP Readings from Last 3 Encounters:   07/13/21 (!) 158/90   07/16/19 136/82   03/05/19 138/86       General Appearance: in no acute distress, Obese   Eyes: pupils equal, round reactive to light and accommodation. Wears glasses  Ears: normal canal and TM's. Nose: nares patent, no lesions. Oral Cavity: mucosa moist.  Throat: clear. Neck/Thyroid: neck supple, full range of motion, no cervical lymphadenopathy, no thyromegaly or carotid bruits. Skin: warm and dry. No suspicious lesions. Heart: regular rate and rhythm. No murmurs. S1, S2 normal, no gallops. Rate 80  Lungs: clear to auscultation bilaterally. Abdomen: bowel sounds present, soft, nontender, nondistended, no masses or organomegaly.    Musculoskeletal: normal, full range of motion in knees and hips, no swelling or tenderness. Extremities: no cyanosis or edema. Peripheral Pulses: 2+ throughout, symetric. Neurologic: nonfocal, motor strength normal upper and lower extremities, sensory exam intact. Psych: normal affect, speech fluent. ASSESSMENT:   Diagnosis Orders   1. Screening for colon cancer  POCT Fecal Immunochemical Test (FIT)   2. Type 2 diabetes mellitus without complication, unspecified whether long term insulin use ([address]     3. Morbid obesity ([facility] [address] [facility]     4. Essential hypertension         PLAN:       A1C in office-13.5. I would like him to go back on metformin 1000 mg twice daily and lisinopril 5 mg daily. We discuss the difficulty falling asleep. He has tried tramadol 5 mg melatonin, and trazodone without success. Given his risk for sleep apnea, I can't give him a sedative as it could increase his risk for stopping breathing. He does drink diet pepsi throughout the day. I recommend that he eliminate his caffeine intake by 1 caffeinated beverage per day, per week until he has discontinued this and then increase the melatonin to 10 mg nightly. He is to get labs done- CBC,  A1C, PSA, Lipids, BMP through [facility] in 1 month. I will see him back in 1 month. Orders Placed This Encounter   Procedures    POCT Fecal Immunochemical Test (FIT)     Standing Status:   Future     Standing Expiration Date:   7/13/2022     No orders of the defined types were placed in this encounter.

## 2021-08-11 DIAGNOSIS — I10 ESSENTIAL HYPERTENSION: ICD-10-CM

## 2021-08-11 DIAGNOSIS — Z00.00 ROUTINE GENERAL MEDICAL EXAMINATION AT A HEALTH CARE FACILITY: ICD-10-CM

## 2021-08-11 DIAGNOSIS — E78.5 HYPERLIPIDEMIA, UNSPECIFIED HYPERLIPIDEMIA TYPE: ICD-10-CM

## 2021-08-11 DIAGNOSIS — E11.9 TYPE 2 DIABETES MELLITUS WITHOUT COMPLICATION, UNSPECIFIED WHETHER LONG TERM INSULIN USE (HCC): Primary | ICD-10-CM

## 2021-08-11 DIAGNOSIS — Z12.5 SCREENING PSA (PROSTATE SPECIFIC ANTIGEN): ICD-10-CM

## 2021-08-12 ENCOUNTER — OFFICE VISIT (OUTPATIENT)
Dept: FAMILY MEDICINE CLINIC | Age: 51
End: 2021-08-12
Payer: COMMERCIAL

## 2021-08-12 VITALS
BODY MASS INDEX: 44.1 KG/M2 | HEIGHT: 71 IN | HEART RATE: 86 BPM | RESPIRATION RATE: 19 BRPM | SYSTOLIC BLOOD PRESSURE: 132 MMHG | WEIGHT: 315 LBS | DIASTOLIC BLOOD PRESSURE: 86 MMHG | OXYGEN SATURATION: 96 %

## 2021-08-12 DIAGNOSIS — R53.83 OTHER FATIGUE: ICD-10-CM

## 2021-08-12 DIAGNOSIS — Z12.11 SCREENING FOR COLON CANCER: ICD-10-CM

## 2021-08-12 DIAGNOSIS — E11.9 TYPE 2 DIABETES MELLITUS WITHOUT COMPLICATION, UNSPECIFIED WHETHER LONG TERM INSULIN USE (HCC): Primary | ICD-10-CM

## 2021-08-12 DIAGNOSIS — R19.7 DIARRHEA, UNSPECIFIED TYPE: ICD-10-CM

## 2021-08-12 DIAGNOSIS — E66.01 MORBID OBESITY (HCC): ICD-10-CM

## 2021-08-12 LAB
ABSOLUTE BASO #: 0.1 X10E9/L (ref 0–0.2)
ABSOLUTE EOS #: 0.1 X10E9/L (ref 0–0.4)
ABSOLUTE LYMPH #: 1.9 X10E9/L (ref 1–3.5)
ABSOLUTE MONO #: 0.7 X10E9/L (ref 0–0.9)
ABSOLUTE NEUT #: 6.3 X10E9/L (ref 1.5–6.6)
ALT SERPL-CCNC: 64 U/L (ref 0–40)
ANION GAP SERPL CALCULATED.3IONS-SCNC: 12 MMOL/L (ref 5–15)
AST SERPL-CCNC: 34 U/L (ref 0–41)
AVERAGE GLUCOSE: 269 MG/DL
BASOPHILS RELATIVE PERCENT: 0.6 %
BUN BLDV-MCNC: 6 MG/DL (ref 5–23)
CALCIUM SERPL-MCNC: 8.9 MG/DL (ref 8.5–10.5)
CHLORIDE BLD-SCNC: 101 MMOL/L (ref 98–109)
CHOLESTEROL/HDL RATIO: 2.6 (ref 1–5)
CHOLESTEROL: 110 MG/DL (ref 150–200)
CO2: 22 MMOL/L (ref 22–32)
CREAT SERPL-MCNC: 0.6 MG/DL (ref 0.6–1.3)
EGFR AFRICAN AMERICAN: >60 ML/MIN/1.73SQ.M
EGFR IF NONAFRICAN AMERICAN: >60 ML/MIN/1.73SQ.M
EOSINOPHILS RELATIVE PERCENT: 0.8 %
GLUCOSE: 167 MG/DL (ref 65–99)
HBA1C MFR BLD: 11 % (ref 4.4–6.4)
HCT VFR BLD CALC: 47 % (ref 39–49)
HDLC SERPL-MCNC: 42 MG/DL
HEMOGLOBIN: 15.8 G/DL (ref 13–17)
LDL CHOLESTEROL CALCULATED: 52 MG/DL
LDL/HDL RATIO: 1.2
LYMPHOCYTE %: 20.9 %
MCH RBC QN AUTO: 29.3 PG (ref 27–34)
MCHC RBC AUTO-ENTMCNC: 33.7 G/DL (ref 32–36)
MCV RBC AUTO: 87 FL (ref 80–100)
MONOCYTES # BLD: 7.5 %
NEUTROPHILS RELATIVE PERCENT: 70.2 %
PDW BLD-RTO: 12.8 % (ref 11.5–15)
PLATELETS: 249 X10E9/L (ref 150–450)
PMV BLD AUTO: 8.6 FL (ref 7–12)
POTASSIUM SERPL-SCNC: 4.5 MMOL/L (ref 3.5–5)
PSA, ULTRASENSITIVE: 0.21 NG/ML (ref 0–4)
RBC: 5.42 X10E12/L (ref 4.1–5.7)
SODIUM BLD-SCNC: 135 MMOL/L (ref 134–146)
T4 TOTAL: 7.8 UG/DL (ref 6.1–12.2)
TRIGL SERPL-MCNC: 79 MG/DL (ref 27–150)
TSH SERPL DL<=0.05 MIU/L-ACNC: 2.34 UIU/ML (ref 0.49–4.67)
VLDLC SERPL CALC-MCNC: 16 MG/DL (ref 0–30)
WBC: 8.9 X10E9/L (ref 4–11)

## 2021-08-12 PROCEDURE — 99214 OFFICE O/P EST MOD 30 MIN: CPT | Performed by: NURSE PRACTITIONER

## 2021-08-12 RX ORDER — PIOGLITAZONEHYDROCHLORIDE 15 MG/1
15 TABLET ORAL DAILY
Qty: 30 TABLET | Refills: 3 | Status: SHIPPED
Start: 2021-08-12 | End: 2021-08-24 | Stop reason: ALTCHOICE

## 2021-08-12 SDOH — ECONOMIC STABILITY: FOOD INSECURITY: WITHIN THE PAST 12 MONTHS, YOU WORRIED THAT YOUR FOOD WOULD RUN OUT BEFORE YOU GOT MONEY TO BUY MORE.: NEVER TRUE

## 2021-08-12 SDOH — ECONOMIC STABILITY: FOOD INSECURITY: WITHIN THE PAST 12 MONTHS, THE FOOD YOU BOUGHT JUST DIDN'T LAST AND YOU DIDN'T HAVE MONEY TO GET MORE.: NEVER TRUE

## 2021-08-12 ASSESSMENT — PATIENT HEALTH QUESTIONNAIRE - PHQ9
SUM OF ALL RESPONSES TO PHQ QUESTIONS 1-9: 2
1. LITTLE INTEREST OR PLEASURE IN DOING THINGS: 1
2. FEELING DOWN, DEPRESSED OR HOPELESS: 1
SUM OF ALL RESPONSES TO PHQ9 QUESTIONS 1 & 2: 2

## 2021-08-12 ASSESSMENT — SOCIAL DETERMINANTS OF HEALTH (SDOH): HOW HARD IS IT FOR YOU TO PAY FOR THE VERY BASICS LIKE FOOD, HOUSING, MEDICAL CARE, AND HEATING?: NOT HARD AT ALL

## 2021-08-12 NOTE — PROGRESS NOTES
Name: Michael Ladd  : 1970         Chief Complaint:     Chief Complaint   Patient presents with    Diabetes     4 week f/u, he reports his BS running 150-220, he does report fatigue, he reports he has had a sleep study in the past but does not have a CPAP machine, he reports urinating around 3 times nightly, and has difficulty falling asleep. History of Present Illness:      Michael Ladd is a 46 y.o.  male who presents with Diabetes (4 week f/u, he reports his BS running 150-220, he does report fatigue, he reports he has had a sleep study in the past but does not have a CPAP machine, he reports urinating around 3 times nightly, and has difficulty falling asleep.)      HPI     DM:  Attending diabetes education: No  Diabetic diet/low carb diet compliance: Denies low sugar/low carb diet  Current exercise: Walking 30 minutes daily  Frequency of exercise: 3 times per week  Frequency of glucose testing at home: QHS and twice daily  Home blood sugar records: 150-220 (fasting)  Glucometer at home: Yes  History of hypoglycemic episodes: no  Last eye exam: 2019  Current symptoms: Denies excessive hunger or excessive thirst. Denies numbness/tingling in hands or feet or major changes in weight. Denies sores that are slow to heal. Admits increased frequency of urination and fatigue. Admits changing vision. reports that he has never smoked. He has never used smokeless tobacco.   Medication compliance:  compliant all of the time  Medication Therapy: metformin 1000mg BID  Hemoglobin A1C (%)   Date Value   2021 13.5   2019 6.4 (H)   2019 6.5   2019 6.2 (H)   2018 7.0 (H)      Fatigue:  Admits fatigue. Sleep apnea link study completed 2016 showed abnormalities. He was encouraged to lose weight at this time to assist with symptoms.      Past Medical History:     Past Medical History:   Diagnosis Date    Diabetes ([address] 2015    Hypertension     Obesity     Morbid    Venous insufficiency       Reviewed all health maintenance requirements and ordered appropriate tests  Health Maintenance Due   Topic Date Due    Colon cancer screen colonoscopy  Never done    Diabetic microalbuminuria test  05/26/2018    Lipid screen  03/05/2020    Potassium monitoring  07/16/2020    Creatinine monitoring  07/16/2020       Past Surgical History:     No past surgical history on file. Medications:       Prior to Admission medications    Medication Sig Start Date End Date Taking? Authorizing Provider   pioglitazone (ACTOS) 15 MG tablet Take 1 tablet by mouth daily 8/12/21  Yes GLENDY Jorge CNP   metFORMIN (GLUCOPHAGE-XR) 500 MG extended release tablet Take 2 tablets by mouth 2 times daily 7/13/21  Yes Eboni Sanabria MD   lisinopril (PRINIVIL;ZESTRIL) 5 MG tablet TAKE 1 TABLET BY MOUTH DAILY 7/13/21  Yes Eboni Sanabria MD   rosuvastatin (CRESTOR) 5 MG tablet Take 1 tablet by mouth daily 7/13/21  Yes Eboni Sanabria MD   traMADol (ULTRAM) 50 MG tablet Take 50 mg by mouth every 6 hours as needed for Pain. Patient not taking: Reported on 8/12/2021    Historical Provider, MD   diclofenac sodium 1 % GEL Apply 2 g topically 2 times daily 3/5/19   Eboni Sanabria MD        Allergies:       Patient has no known allergies. Social History:     Tobacco:    reports that he has never smoked. He has never used smokeless tobacco.  Alcohol:      reports current alcohol use. Drug Use:  reports no history of drug use.     Family History:     Family History   Problem Relation Age of Onset    High Blood Pressure Mother     High Cholesterol Mother     Emphysema Mother     Stroke Mother     Diabetes Father 48    Cancer Father     Other Sister         Smoker, chronic cough, SOB, 300+ lbs    Diabetes Paternal Uncle         Type 1    Cancer Paternal Uncle         Prostate    Heart Attack Paternal Grandfather     Diabetes Paternal Grandfather     Cataracts Paternal Grandfather        Review of Systems:     Positive and Negative as described in HPI    Review of Systems   Constitutional: Positive for fatigue. Endocrine: Positive for polyuria. Negative for polydipsia and polyphagia. Physical Exam:   Vitals:  /86 (Site: Left Upper Arm, Position: Sitting, Cuff Size: Large Adult)   Pulse 86   Resp 19   Ht 5' 11\" (1.803 m)   Wt (!) 389 lb 6.4 oz (176.6 kg)   SpO2 96%   BMI 54.31 kg/m²     Physical Exam  Constitutional:       General: He is not in acute distress. Appearance: Normal appearance. He is obese. He is not ill-appearing or toxic-appearing. Cardiovascular:      Rate and Rhythm: Normal rate and regular rhythm. Heart sounds: Normal heart sounds. No murmur heard. Pulmonary:      Effort: Pulmonary effort is normal. No respiratory distress. Breath sounds: Normal breath sounds. No stridor. No wheezing, rhonchi or rales. Neurological:      Mental Status: He is alert. Psychiatric:         Mood and Affect: Mood normal.         Behavior: Behavior normal.         Thought Content:  Thought content normal.         Judgment: Judgment normal.         Data:     Lab Results   Component Value Date     07/16/2019    K 4.5 07/16/2019     07/16/2019    CO2 24 07/16/2019    BUN 12 07/16/2019    CREATININE 0.67 07/16/2019    GLUCOSE 103 07/16/2019    GLUCOSE 116 02/12/2018    PROT 7.2 05/26/2017    LABALBU 4.2 05/26/2017    BILITOT 0.43 05/26/2017    ALKPHOS 52 05/26/2017    AST 16 03/05/2019    ALT 29 03/05/2019     Lab Results   Component Value Date    WBC 13.7 05/09/2019    RBC 5.30 05/09/2019    RBC 5.30 02/12/2018    HGB 15.4 05/09/2019    HCT 46.4 05/09/2019    MCV 87.5 05/09/2019    MCH 29.1 05/09/2019    MCHC 33.2 05/09/2019    RDW 13.1 05/09/2019     05/09/2019    MPV 8.9 05/09/2019     No results found for: TSH  Lab Results   Component Value Date    CHOL 171 03/05/2019    HDL 49 03/05/2019    PSA 0.63 07/16/2019    LABA1C 13.5 07/13/2021 Assessment/Plan:      Diagnosis Orders   1. Type 2 diabetes mellitus without complication, unspecified whether long term insulin use ([facility] [address]     2. Screening for colon cancer  [facility]   3. Diarrhea, unspecified type     4. Morbid obesity ([facility] [address]     5. Other fatigue         DM:   Counseled on diabetes education, patient declines  Recommended annual diabetic eye exam  Recommended nutritionist, patient declines  Counseled at length on diabetic diet and routine physical activity  Discussed goals of weight loss in the treatment of DM. Patient states he is considering a consultation with [place] weight loss center. Will not repeat A1c at this time due to concerns of insurance coverage. We will plan to repeat A1c at next visit in 2 months. Based on fasting glucose numbers, will add Actos 15 mg. Reviewed side effects. Continue Metformin 1000 mg twice daily. Diarrheal symptoms have greatly improved, per patient  Notify office if symptoms worsen or persist    Fatigue:  Recommended repeat sleep apnea test, patient declines  Counseled on weight loss to assist with likely JUAN MANUEL    Wellness:  Patient is due for colorectal cancer screening. Discussed concerns with irregular bowel habits as well as possible hemorrhoids. Patient agreeable to colonoscopy. Referral to [address] [place] [address] placed today. Completed Refills   Requested Prescriptions     Signed Prescriptions Disp Refills    pioglitazone (ACTOS) 15 MG tablet 30 tablet 3     Sig: Take 1 tablet by mouth daily       Orders Placed This Encounter   Procedures   [facility]     Referral Priority:   Routine     Referral Type:   Eval and Treat     Referral Reason:   Specialty Services Required     Requested Specialty:   General Surgery     Number of Visits Requested:   1        No results found for this visit on 08/12/21.     Return in about 2 months (around 10/12/2021), or if symptoms worsen or fail to improve, for DM f/u with POC A1c + anxiety + GI.     Electronically signed by GLENDY Barnett CNP on 08/12/21 at 3:54 PM.

## 2021-08-12 NOTE — PATIENT INSTRUCTIONS
SURVEY:    You may be receiving a survey from [employer] regarding your visit today. Please complete the survey to enable us to provide the highest quality of care to you and your family. If you cannot score us a very good on any question, please call the office to discuss how we could have made your experience a very good one. Thank you.

## 2021-08-16 ENCOUNTER — TELEPHONE (OUTPATIENT)
Dept: FAMILY MEDICINE CLINIC | Age: 51
End: 2021-08-16

## 2021-08-16 NOTE — TELEPHONE ENCOUNTER
Please notify patient to discontinue actos. Monitor fasting glucose x1 week and call office with readings.

## 2021-08-16 NOTE — TELEPHONE ENCOUNTER
Patient states he was able to get Actos. He is worried about how the medication is making him feel. He states he feels very fatigue and \"funny in the head\".

## 2021-08-24 ENCOUNTER — PATIENT MESSAGE (OUTPATIENT)
Dept: FAMILY MEDICINE CLINIC | Age: 51
End: 2021-08-24

## 2021-08-24 RX ORDER — PIOGLITAZONEHYDROCHLORIDE 30 MG/1
30 TABLET ORAL DAILY
Qty: 30 TABLET | Refills: 3 | Status: SHIPPED
Start: 2021-08-24 | End: 2021-08-24 | Stop reason: SINTOL

## 2021-08-24 RX ORDER — EMPAGLIFLOZIN 10 MG/1
1 TABLET, FILM COATED ORAL DAILY
Qty: 30 TABLET | Refills: 2 | Status: SHIPPED
Start: 2021-08-24 | End: 2022-01-18

## 2021-08-24 NOTE — TELEPHONE ENCOUNTER
From: Charleen Kocher  To: Nighat Cole, APRN - CNP  Sent: 8/24/2021 9:12 AM EDT  Subject: Non-Urgent Medical Question    Hi just wanted to give you a week of my fasting blood glucose readings. 8/18 221. 8/19 209. 8/20 187. 8/21 194. 8/22 178. 8/23 144. 8/24 149.

## 2021-08-24 NOTE — TELEPHONE ENCOUNTER
Notify patient to increase Actos from 15mg QD to 30mg QD. Updated rx sent to pharmacy. Call office/drop off readings in 4 weeks with glucose averages for review. Keep 10/2021 DM f/u with Dr. Lisa Paniagua.

## 2021-10-13 ENCOUNTER — OFFICE VISIT (OUTPATIENT)
Dept: FAMILY MEDICINE CLINIC | Age: 51
End: 2021-10-13
Payer: COMMERCIAL

## 2021-10-13 VITALS
SYSTOLIC BLOOD PRESSURE: 134 MMHG | DIASTOLIC BLOOD PRESSURE: 82 MMHG | BODY MASS INDEX: 44.1 KG/M2 | OXYGEN SATURATION: 97 % | WEIGHT: 315 LBS | HEIGHT: 71 IN

## 2021-10-13 DIAGNOSIS — R60.9 WATER RETENTION: ICD-10-CM

## 2021-10-13 DIAGNOSIS — E11.9 TYPE 2 DIABETES MELLITUS WITHOUT COMPLICATION, UNSPECIFIED WHETHER LONG TERM INSULIN USE (HCC): Primary | ICD-10-CM

## 2021-10-13 DIAGNOSIS — E66.01 MORBID OBESITY (HCC): ICD-10-CM

## 2021-10-13 LAB — HBA1C MFR BLD: 10.6 %

## 2021-10-13 PROCEDURE — 99214 OFFICE O/P EST MOD 30 MIN: CPT | Performed by: FAMILY MEDICINE

## 2021-10-13 PROCEDURE — 83036 HEMOGLOBIN GLYCOSYLATED A1C: CPT | Performed by: FAMILY MEDICINE

## 2021-10-13 RX ORDER — HYDROCHLOROTHIAZIDE 25 MG/1
25 TABLET ORAL EVERY MORNING
Qty: 90 TABLET | Refills: 1 | Status: SHIPPED | OUTPATIENT
Start: 2021-10-13 | End: 2022-03-28 | Stop reason: SDUPTHER

## 2021-10-13 RX ORDER — PIOGLITAZONEHYDROCHLORIDE 45 MG/1
45 TABLET ORAL DAILY
Qty: 90 TABLET | Refills: 1 | Status: SHIPPED
Start: 2021-10-13 | End: 2021-10-26

## 2021-10-13 RX ORDER — PIOGLITAZONEHYDROCHLORIDE 15 MG/1
15 TABLET ORAL DAILY
COMMUNITY
End: 2021-10-13 | Stop reason: DRUGHIGH

## 2021-10-13 NOTE — PROGRESS NOTES
I, Carla Ríos [facility], am scribing for and in the presence of Dr. Miguelito Hdz. 10/13/21/4:20/CRF    [address] [address]  [place] [id number]  Dept: [phone]    Justina Thomson is a 46 y.o. male here for Diabetes and Anxiety    - pt provides log of blood sugar - fasting 120-200 , states after meals is in the same range   -pt states he can press into his calf and it stays for some time   -legs / feet go numb and swollen with long periods of sitting  - questions weight gain/ water retention with ACTOS 15 mg     HPI:  DIABETES MELLITUS TYPE II  Medication compliance: noncompliant: ran out of medication since he did not have insurance  Diabetic diet compliance: compliant most of the time  Current exercise: no regular exercise  Frequency of testing: none   Any episodes of hypoglycemia? No  Eye exam current (within one year): no  Diabetic foot check in the past year: yes, 3/2019   reports that he has never smoked. He has never used smokeless tobacco.      HYPERTENSION  No regular exercising and is adherent to a low-salt diet. Blood pressure is being monitored at home, 140/90s. Cardiac symptoms: none. Patient denies: chest pain, irregular heart beat and palpitations    Prior to Admission medications    Medication Sig Start Date End Date Taking?  Authorizing Provider   pioglitazone (ACTOS) 45 MG tablet Take 1 tablet by mouth daily 10/13/21  Yes Miguelito Hdz MD   hydroCHLOROthiazide (HYDRODIURIL) 25 MG tablet Take 1 tablet by mouth every morning 10/13/21  Yes Miguelito Hdz MD   metFORMIN (GLUCOPHAGE-XR) 500 MG extended release tablet Take 2 tablets by mouth 2 times daily 7/13/21  Yes Miguelito Hdz MD   lisinopril (PRINIVIL;ZESTRIL) 5 MG tablet TAKE 1 TABLET BY MOUTH DAILY 7/13/21  Yes Miguelito Hdz MD   rosuvastatin (CRESTOR) 5 MG tablet Take 1 tablet by mouth daily 7/13/21  Yes Miguelito Hdz MD   empagliflozin (JARDIANCE) 10 MG tablet Take 1 tablet by mouth daily  Patient not taking: Reported on 10/13/2021 8/24/21   GLENDY Isaacs - CNP   traMADol (ULTRAM) 50 MG tablet Take 50 mg by mouth every 6 hours as needed for Pain. Patient not taking: Reported on 8/12/2021    Historical Provider, MD   diclofenac sodium 1 % GEL Apply 2 g topically 2 times daily 3/5/19   Kyleigh Guidry MD       ROS:  General Constitutional: admits chills. Denies fever. admits headache and lightheadedness. Admits bloody nose at hs with cough   Ophthalmologic: Denies blurred vision. ENT: admits nasal drainage. admits sore throat. Denies ear pain and pressure. Respiratory: admits cough for past 6 weeks. admits shortness of breath. admits wheezing. Cardiovascular: admits chest pain with cough. Denies irregular heartbeat. Denies palpitations. Gastrointestinal: Denies abdominal pain. Denies blood in the stool. admits constipation. admits diarrhea. Denies nausea. Denies vomiting. Genitourinary: Denies blood in the urine. Denies difficulty urinating. Denies frequent urination. Denies painful urination. Denies urinary incontinence. Musculoskeletal: Denies muscle aches. Denies painful joints. Denies swollen joints. Peripheral Vascular: admits pain/cramping in legs in the evening  Skin: Denies dry skin. Denies itching. Denies rash. Neurologic: Denies falls. Denies dizziness. Denies fainting. admits tingling/numbness in feet with extended periods of sitting   Psychiatric: admits sleep disturbance ,sleep well after alcohol , wakes up often. Admits anxiety and depressed mood. No past surgical history on file.     Family History   Problem Relation Age of Onset    High Blood Pressure Mother     High Cholesterol Mother     Emphysema Mother     Stroke Mother     Diabetes Father 48    Cancer Father     Other Sister         Smoker, chronic cough, SOB, 300+ lbs    Diabetes Paternal Uncle         Type 1    Cancer Paternal Uncle         Prostate    Heart Attack Paternal [place] Diabetes Paternal Grandfather     Cataracts Paternal Grandfather        Past Medical History:   Diagnosis Date    Diabetes ([facility] [address] 2015    Hypertension     Obesity     Morbid    Venous insufficiency       Social History     Tobacco Use    Smoking status: Never Smoker    Smokeless tobacco: Never Used   Substance Use Topics    Alcohol use: Yes     Alcohol/week: 0.0 standard drinks     Comment: occ, beer 1-2x/month      Current Outpatient Medications   Medication Sig Dispense Refill    pioglitazone (ACTOS) 45 MG tablet Take 1 tablet by mouth daily 90 tablet 1    hydroCHLOROthiazide (HYDRODIURIL) 25 MG tablet Take 1 tablet by mouth every morning 90 tablet 1    metFORMIN (GLUCOPHAGE-XR) 500 MG extended release tablet Take 2 tablets by mouth 2 times daily 360 tablet 3    lisinopril (PRINIVIL;ZESTRIL) 5 MG tablet TAKE 1 TABLET BY MOUTH DAILY 90 tablet 3    rosuvastatin (CRESTOR) 5 MG tablet Take 1 tablet by mouth daily 90 tablet 3    empagliflozin (JARDIANCE) 10 MG tablet Take 1 tablet by mouth daily (Patient not taking: Reported on 10/13/2021) 30 tablet 2    traMADol (ULTRAM) 50 MG tablet Take 50 mg by mouth every 6 hours as needed for Pain. (Patient not taking: Reported on 8/12/2021)      diclofenac sodium 1 % GEL Apply 2 g topically 2 times daily 1 Tube 3     No current facility-administered medications for this visit. No Known Allergies    PHYSICAL EXAM:    /82   Ht 5' 11\" (1.803 m)   Wt (!) 403 lb 9.6 oz (183.1 kg)   SpO2 97%   BMI 56.29 kg/m²   Wt Readings from Last 3 Encounters:   10/13/21 (!) 403 lb 9.6 oz (183.1 kg)   08/12/21 (!) 389 lb 6.4 oz (176.6 kg)   07/13/21 (!) 395 lb (179.2 kg)     BP Readings from Last 3 Encounters:   10/13/21 134/82   08/12/21 132/86   07/13/21 (!) 158/90       General Appearance: in no acute distress, well developed, well nourished. Eyes: pupils equal, round reactive to light and accommodation. Ears: normal canal and TM's.   Nose: nares patent, no lesions. Oral Cavity: mucosa moist.  Throat: clear. Neck/Thyroid: neck supple, full range of motion, no cervical lymphadenopathy, no thyromegaly or carotid bruits. Skin: warm and dry. No suspicious lesions. Heart: regular rate and rhythm. No murmurs. S1, S2 normal, no gallops. Rate 80  Lungs: clear to auscultation bilaterally. Abdomen: bowel sounds present, soft, nontender, nondistended, no masses or organomegaly. Round morbidly obese  Musculoskeletal: normal, full range of motion in knees and hips, no swelling or tenderness. Extremities: no cyanosis or edema. Statis changes 1+ edema   Peripheral Pulses: 2+ throughout, symetric. Neurologic: nonfocal, motor strength normal upper and lower extremities, sensory exam intact. Psych: normal affect, speech fluent. ASSESSMENT:   Diagnosis Orders   1. Type 2 diabetes mellitus without complication, unspecified whether long term insulin use (HCC)  POCT glycosylated hemoglobin (Hb A1C)    pioglitazone (ACTOS) 45 MG tablet   2. Water retention  hydroCHLOROthiazide (HYDRODIURIL) 25 MG tablet   3. Morbid obesity ([address]         PLAN:  I get an A1C in office today and it resulted in 10.6, which is slightly improved as his last A1C was 11.0. I would like to increase his Actos 15 mg to Actos 45 mg. I will also send in Hydrochlorothiazide 25 mg for water retention. He is going to a seminar in [facility] this month for weight loss , and information on surgery for a mesh sleeve. We discuss weight loss. We discuss his diet, he states he eats a salad before his main meals and I encourage him to use a vinaigrette as his dressing instead of ranch. He has the mentality to do this he just needs to be more strict with himself. 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    We started wellbutrin XL 150mg qam for 2 weeks then 300mg qam    I will see him back in  3 months     Orders Placed This Encounter   Procedures    POCT glycosylated hemoglobin (Hb A1C)     Orders Placed This Encounter   Medications    pioglitazone (ACTOS) 45 MG tablet     Sig: Take 1 tablet by mouth daily     Dispense:  90 tablet     Refill:  1    hydroCHLOROthiazide (HYDRODIURIL) 25 MG tablet     Sig: Take 1 tablet by mouth every morning     Dispense:  90 tablet     Refill:  1   I, Dr. Radha Page, personally performed the services described in this documentation as scribed/transcribed by STEPHAN Cárdenas in my presence, and is both accurate and complete.

## 2021-10-15 ENCOUNTER — TELEPHONE (OUTPATIENT)
Dept: FAMILY MEDICINE CLINIC | Age: 51
End: 2021-10-15

## 2021-10-15 DIAGNOSIS — F41.8 ANXIETY WITH DEPRESSION: Primary | ICD-10-CM

## 2021-10-15 RX ORDER — BUPROPION HYDROCHLORIDE 150 MG/1
150 TABLET ORAL EVERY MORNING
Qty: 14 TABLET | Refills: 0 | Status: SHIPPED
Start: 2021-10-15 | End: 2022-01-18

## 2021-10-15 RX ORDER — BUPROPION HYDROCHLORIDE 300 MG/1
300 TABLET ORAL EVERY MORNING
Qty: 90 TABLET | Refills: 0 | Status: SHIPPED | OUTPATIENT
Start: 2021-10-15 | End: 2022-01-11

## 2021-10-15 NOTE — TELEPHONE ENCOUNTER
Per verbal order , send in Wellbutrin XL (150 mg for 2 weeks and increase to 300 mg )    Pt is agreeable , sent to [facility]

## 2021-10-26 RX ORDER — SEMAGLUTIDE 1.34 MG/ML
0.25 INJECTION, SOLUTION SUBCUTANEOUS WEEKLY
Qty: 3 PEN | Refills: 0 | Status: SHIPPED | OUTPATIENT
Start: 2021-10-26 | End: 2022-02-15

## 2021-10-26 NOTE — TELEPHONE ENCOUNTER
Insurance would not cover actos. Dr. Tony Cho gave orders to try ozempic instead as this is preferred. Pt. Daysi Richard. Rx sent.

## 2021-11-03 ENCOUNTER — TELEPHONE (OUTPATIENT)
Dept: BARIATRICS/WEIGHT MGMT | Age: 51
End: 2021-11-03

## 2021-11-03 NOTE — TELEPHONE ENCOUNTER
Patient attended [facility] session on 10/27, notified not a covered benefit with plan.  Discussed private pay and nonsurgical

## 2022-01-11 DIAGNOSIS — F41.8 ANXIETY WITH DEPRESSION: ICD-10-CM

## 2022-01-11 RX ORDER — BUPROPION HYDROCHLORIDE 300 MG/1
TABLET ORAL
Qty: 90 TABLET | Refills: 0 | Status: SHIPPED
Start: 2022-01-11 | End: 2022-01-18

## 2022-01-18 ENCOUNTER — OFFICE VISIT (OUTPATIENT)
Dept: FAMILY MEDICINE CLINIC | Age: 52
End: 2022-01-18
Payer: COMMERCIAL

## 2022-01-18 VITALS
HEIGHT: 71 IN | BODY MASS INDEX: 44.1 KG/M2 | DIASTOLIC BLOOD PRESSURE: 76 MMHG | WEIGHT: 315 LBS | SYSTOLIC BLOOD PRESSURE: 118 MMHG

## 2022-01-18 DIAGNOSIS — E11.9 TYPE 2 DIABETES MELLITUS WITHOUT COMPLICATION, UNSPECIFIED WHETHER LONG TERM INSULIN USE (HCC): Primary | ICD-10-CM

## 2022-01-18 DIAGNOSIS — F41.8 ANXIETY WITH DEPRESSION: ICD-10-CM

## 2022-01-18 DIAGNOSIS — F51.05 INSOMNIA SECONDARY TO ANXIETY: ICD-10-CM

## 2022-01-18 DIAGNOSIS — E66.01 MORBID OBESITY (HCC): ICD-10-CM

## 2022-01-18 DIAGNOSIS — F41.9 INSOMNIA SECONDARY TO ANXIETY: ICD-10-CM

## 2022-01-18 LAB — HBA1C MFR BLD: 7.2 %

## 2022-01-18 PROCEDURE — 83036 HEMOGLOBIN GLYCOSYLATED A1C: CPT | Performed by: FAMILY MEDICINE

## 2022-01-18 PROCEDURE — 99214 OFFICE O/P EST MOD 30 MIN: CPT | Performed by: FAMILY MEDICINE

## 2022-01-18 PROCEDURE — 3051F HG A1C>EQUAL 7.0%<8.0%: CPT | Performed by: FAMILY MEDICINE

## 2022-01-18 RX ORDER — ESCITALOPRAM OXALATE 5 MG/1
5 TABLET ORAL DAILY
Qty: 90 TABLET | Refills: 1 | Status: SHIPPED
Start: 2022-01-18 | End: 2022-03-22 | Stop reason: DRUGHIGH

## 2022-01-18 NOTE — PROGRESS NOTES
7/13/21  Yes Bobo Jo MD   rosuvastatin (CRESTOR) 5 MG tablet Take 1 tablet by mouth daily 7/13/21  Yes Bobo Jo MD   diclofenac sodium 1 % GEL Apply 2 g topically 2 times daily 3/5/19   Bobo Jo MD       ROS:  General Constitutional: Denies chills. Denies fever. Denies headache. Denies lightheadedness. Ophthalmologic: Denies blurred vision. ENT: Denies nasal congestion. Denies sore throat. Denies ear pain and pressure. Respiratory: Denies cough. Denies shortness of breath. Denies wheezing. Cardiovascular: Denies chest pain at rest. Denies irregular heartbeat. Denies palpitations. Gastrointestinal: Denies abdominal pain. Denies blood in the stool. Denies constipation. Denies diarrhea. Denies nausea. Denies vomiting. Genitourinary: Denies blood in the urine. Denies difficulty urinating. Denies frequent urination. Denies painful urination. Denies urinary incontinence. Musculoskeletal: Denies muscle aches. Denies painful joints. Denies swollen joints. Peripheral Vascular: Denies pain/cramping in legs after exertion. Skin: Denies dry skin. Denies itching. Denies rash. Neurologic: Denies falls. Denies dizziness. Denies fainting. Denies tingling/numbness. Psychiatric: Admits sleep disturbance, wakes at least 2x nightly, racing thoughts about his weight. Denies anxiety. Admits depressed mood, crushed me to get the news that insurance will not cover the bariatric surgery. He struggles with overeating and craving carbs on the weekend. No past surgical history on file.     Family History   Problem Relation Age of Onset    High Blood Pressure Mother     High Cholesterol Mother     Emphysema Mother     Stroke Mother     Diabetes Father 48    Cancer Father     Other Sister         Smoker, chronic cough, SOB, 300+ lbs    Diabetes Paternal Uncle         Type 1    Cancer Paternal Uncle         Prostate    Heart Attack Paternal Grandfather     Diabetes Paternal Grandfather     Cataracts Paternal Grandfather        Past Medical History:   Diagnosis Date    Diabetes ([facility] [address] 2015    Hypertension     Obesity     Morbid    Venous insufficiency       Social History     Tobacco Use    Smoking status: Never Smoker    Smokeless tobacco: Never Used   Substance Use Topics    Alcohol use: Yes     Alcohol/week: 0.0 standard drinks     Comment: occ, beer 1-2x/month      Current Outpatient Medications   Medication Sig Dispense Refill    escitalopram (LEXAPRO) 5 MG tablet Take 1 tablet by mouth daily 90 tablet 1    Semaglutide,0.25 or 0.5MG/DOS, (OZEMPIC, 0.25 OR 0.5 MG/DOSE,) 2 MG/1.5ML SOPN Inject 0.25 mg into the skin once a week 3 pen 0    hydroCHLOROthiazide (HYDRODIURIL) 25 MG tablet Take 1 tablet by mouth every morning 90 tablet 1    metFORMIN (GLUCOPHAGE-XR) 500 MG extended release tablet Take 2 tablets by mouth 2 times daily (Patient taking differently: Take 1,000 mg by mouth daily (with breakfast) ) 360 tablet 3    lisinopril (PRINIVIL;ZESTRIL) 5 MG tablet TAKE 1 TABLET BY MOUTH DAILY 90 tablet 3    rosuvastatin (CRESTOR) 5 MG tablet Take 1 tablet by mouth daily 90 tablet 3    diclofenac sodium 1 % GEL Apply 2 g topically 2 times daily 1 Tube 3     No current facility-administered medications for this visit. No Known Allergies    PHYSICAL EXAM:    /76   Ht 5' 11\" (1.803 m)   Wt (!) 433 lb (196.4 kg)   BMI 60.39 kg/m²   Wt Readings from Last 3 Encounters:   01/18/22 (!) 433 lb (196.4 kg)   10/13/21 (!) 403 lb 9.6 oz (183.1 kg)   08/12/21 (!) 389 lb 6.4 oz (176.6 kg)     BP Readings from Last 3 Encounters:   01/18/22 118/76   10/13/21 134/82   08/12/21 132/86       General Appearance: in no acute distress, well developed, well nourished. Eyes: pupils equal, round reactive to light and accommodation. Ears: normal canal and TM's. Nose: nares patent, no lesions. Oral Cavity: mucosa moist.  Throat: clear.   Neck/Thyroid: neck supple, full range of motion, no cervical lymphadenopathy, no thyromegaly or carotid bruits. Skin: warm and dry. No suspicious lesions. Heart: regular rate and rhythm. No murmurs. S1, S2 normal, no gallops. Rate: 75  Lungs: clear to auscultation bilaterally. Abdomen: bowel sounds present, soft, nontender, nondistended, no masses or organomegaly. Morbidly obese, round. Musculoskeletal: normal, full range of motion in knees and hips, no swelling or tenderness. Extremities: no cyanosis or edema. Peripheral Pulses: 2+ throughout, symetric. Neurologic: nonfocal, motor strength normal upper and lower extremities, sensory exam intact. Psych: normal affect, speech fluent. ASSESSMENT:   Diagnosis Orders   1. Type 2 diabetes mellitus without complication, unspecified whether long term insulin use (HCC)  POCT glycosylated hemoglobin (Hb A1C)   2. Anxiety with depression     3. Morbid obesity ([address]     4. Insomnia secondary to anxiety         PLAN:  We discuss his glucose readings and post prandial insulin spikes and how this works after a meal. His fasting glucose looks great. I think it would be prudent for him to start checking his glucose 1-2 hours and 3-4 hours after a meal to see where his glucose is at that time. His A1C is down to 7.2. This is incredible from where it was. I encourage him to try to space his carbohydrate load out so he has more frequent small spikes versus one large spike. I also recommend that he limit calorie intake and portion size. The diabetes control is improving and the weight will follow if he keeps at it. We discuss how insurance does not cover bariatric surgery. We discuss the mental side of things. Anxiety sometimes will heighten with use of wellbutrin. I think we were focusing on the depressive symptoms versus anxiety. I will start him on lexapro 5 mg daily. Given possible sleep apnea, I won't give him anything sedating. I would like to see him back next month to get him into a routine of accountability. Orders Placed This Encounter   Procedures    POCT glycosylated hemoglobin (Hb A1C)     Orders Placed This Encounter   Medications    escitalopram (LEXAPRO) 5 MG tablet     Sig: Take 1 tablet by mouth daily     Dispense:  90 tablet     Refill:  1     I, Dr. Jann Jama, personally performed the services described in this documentation as scribed/transcribed by CIRO Zhao in my presence, and is both accurate and complete.

## 2022-01-18 NOTE — PATIENT INSTRUCTIONS
PLAN:  We discuss his glucose readings and post prandial insulin spikes and how this works after a meal. His fasting glucose looks great. I think it would be prudent for him to start checking his glucose 1-2 hours and 3-4 hours after a meal to see where his glucose is at that time. His A1C is down to 7.2. This is incredible from where it was. I encourage him to try to space his carbohydrate load out so he has more frequent small spikes versus one large spike. I also recommend that he limit calorie intake and portion size. The diabetes control is improving and the weight will follow if he keeps at it. We discuss how insurance does not cover bariatric surgery. We discuss the mental side of things. Anxiety sometimes will heighten with use of wellbutrin. I think we were focusing on the depressive symptoms versus anxiety. I will start him on lexapro 5 mg daily. Given possible sleep apnea, I won't give him anything sedating. I would like to see him back next month to get him into a routine of accountability. SURVEY:    You may be receiving a survey from [employer] regarding your visit today. Please complete the survey to enable us to provide the highest quality of care to you and your family. If you cannot score us a very good on any question, please call the office to discuss how we could of made your experience a very good one. Thank you.

## 2022-02-15 RX ORDER — SEMAGLUTIDE 1.34 MG/ML
INJECTION, SOLUTION SUBCUTANEOUS
Qty: 1.5 ML | Refills: 3 | Status: SHIPPED | OUTPATIENT
Start: 2022-02-15 | End: 2022-05-24 | Stop reason: SDUPTHER

## 2022-03-22 ENCOUNTER — OFFICE VISIT (OUTPATIENT)
Dept: FAMILY MEDICINE CLINIC | Age: 52
End: 2022-03-22
Payer: COMMERCIAL

## 2022-03-22 VITALS
WEIGHT: 315 LBS | BODY MASS INDEX: 44.1 KG/M2 | SYSTOLIC BLOOD PRESSURE: 122 MMHG | DIASTOLIC BLOOD PRESSURE: 70 MMHG | HEIGHT: 71 IN

## 2022-03-22 DIAGNOSIS — F41.8 ANXIETY WITH DEPRESSION: ICD-10-CM

## 2022-03-22 DIAGNOSIS — E11.9 TYPE 2 DIABETES MELLITUS WITHOUT COMPLICATION, UNSPECIFIED WHETHER LONG TERM INSULIN USE (HCC): Primary | ICD-10-CM

## 2022-03-22 DIAGNOSIS — F51.05 INSOMNIA SECONDARY TO ANXIETY: ICD-10-CM

## 2022-03-22 DIAGNOSIS — F41.9 INSOMNIA SECONDARY TO ANXIETY: ICD-10-CM

## 2022-03-22 DIAGNOSIS — I10 ESSENTIAL HYPERTENSION: ICD-10-CM

## 2022-03-22 DIAGNOSIS — E66.01 MORBID OBESITY (HCC): ICD-10-CM

## 2022-03-22 PROCEDURE — 99213 OFFICE O/P EST LOW 20 MIN: CPT | Performed by: FAMILY MEDICINE

## 2022-03-22 PROCEDURE — 3051F HG A1C>EQUAL 7.0%<8.0%: CPT | Performed by: FAMILY MEDICINE

## 2022-03-22 RX ORDER — ESCITALOPRAM OXALATE 10 MG/1
10 TABLET ORAL DAILY
Qty: 30 TABLET | Refills: 3 | Status: SHIPPED | OUTPATIENT
Start: 2022-03-22 | End: 2022-05-24 | Stop reason: SDUPTHER

## 2022-03-22 NOTE — PATIENT INSTRUCTIONS
PLAN:  We discuss self control and how he struggles with setting limitations. I explain that the short-term benefit and relief one gets is similar to what an addict would get. We discuss the importance of self esteem and recognizing the inherent value that each of us have. He is currently doing yoga. He has stopped drinking soda. I commend him on his efforts and for the success that he has seen thus far. I don't recommend that he let his heart rate beyond 2/3 of his max for too long as this is more of an anaerobic level and can cause inflammation. I would like him to stay on the same dose of ozempic. I would like to increase the lexapro to 10 mg daily and see if we can get more improvement in sleep habits. I will see him back in 2 months. SURVEY:    You may be receiving a survey from [employer] regarding your visit today. Please complete the survey to enable us to provide the highest quality of care to you and your family. If you cannot score us a very good on any question, please call the office to discuss how we could of made your experience a very good one. Thank you.

## 2022-03-22 NOTE — PROGRESS NOTES
Fitz Counts, RMA, am scribing for and in the presence of Dr. Ladan Shelton. 03/22/2022 3:46 pm [address]  [address]  [place] [address]  Dept: [phone]    HPI:  Pt. Presents for 1 month f/u on weight management and anxiety. On 01/18, he was started on lexapro 5 mg daily. He states he is doing okay with this. He has noticed some improvement in his ability to get to sleep quicker from 2 hours down to about 45 minutes). He denies any side effects. Today, he states thinnks motivation wise he is doing well with weight loss. He gave up pop completely. He is also eating more salads and greens. He has lost 21 lbs. He is doing low carb diet and yoga (3x weekly). He has notcied some improved flexibility. He has not had fast food for 2 months. glucose 1-2 hours after meals it's been 110. The highest her saw was 115 and lowest he saw was 94. Current Outpatient Medications   Medication Sig Dispense Refill    OZEMPIC, 0.25 OR 0.5 MG/DOSE, 2 MG/1.5ML SOPN DIAL AND INJECT UNDER THE SKIN 0.25 MG ONCE WEEKLY 1.5 mL 3    escitalopram (LEXAPRO) 5 MG tablet Take 1 tablet by mouth daily 90 tablet 1    hydroCHLOROthiazide (HYDRODIURIL) 25 MG tablet Take 1 tablet by mouth every morning 90 tablet 1    metFORMIN (GLUCOPHAGE-XR) 500 MG extended release tablet Take 2 tablets by mouth 2 times daily (Patient taking differently: Take 1,000 mg by mouth daily (with breakfast) ) 360 tablet 3    lisinopril (PRINIVIL;ZESTRIL) 5 MG tablet TAKE 1 TABLET BY MOUTH DAILY 90 tablet 3    rosuvastatin (CRESTOR) 5 MG tablet Take 1 tablet by mouth daily 90 tablet 3    diclofenac sodium 1 % GEL Apply 2 g topically 2 times daily 1 Tube 3     No current facility-administered medications for this visit. ROS:  ROS:  General Constitutional: Denies chills. Denies fever. Denies headache. Denies lightheadedness. Ophthalmologic: Denies blurred vision. ENT: Denies nasal congestion. Denies sore throat. Denies ear pain and pressure. Respiratory: Denies cough. Denies shortness of breath. Denies wheezing. Cardiovascular: Denies chest pain at rest. Denies irregular heartbeat. Denies palpitations. Gastrointestinal: Denies abdominal pain. Denies blood in the stool. Denies constipation. Denies diarrhea. Denies nausea. Denies vomiting. Genitourinary: Denies blood in the urine. Denies difficulty urinating. Denies frequent urination. Denies painful urination. Denies urinary incontinence  Musculoskeletal: Denies muscle aches. Denies painful joints. Denies swollen joints. Peripheral Vascular: Denies pain/cramping in legs after exertion. Skin: Denies dry skin. Denies itching. Denies rash. Neurologic: Denies falls. Denies dizziness. Denies fainting. Denies tingling/numbness. Psychiatric: Denies sleep disturbance. Denies anxiety. Denies depressed mood. EXAM:  There were no vitals taken for this visit. Wt Readings from Last 3 Encounters:   01/18/22 (!) 433 lb (196.4 kg)   10/13/21 (!) 403 lb 9.6 oz (183.1 kg)   08/12/21 (!) 389 lb 6.4 oz (176.6 kg)     BP Readings from Last 3 Encounters:   01/18/22 118/76   10/13/21 134/82   08/12/21 132/86     PHYSICAL EXAM:  General Appearance: in no acute distress, well developed, well nourished. Eyes: pupils equal, round reactive to light and accommodation. Ears: normal canal and TM's. Nose: nares patent, no lesions. Oral Cavity: mucosa moist.  Throat: clear. Neck/Thyroid: neck supple, full range of motion, no cervical lymphadenopathy, no thyromegaly or carotid bruits. Skin: warm and dry. No suspicious lesions. Heart: regular rate and rhythm. No murmurs. S1, S2 normal, no gallops. Lungs: clear to auscultation bilaterally. Abdomen: bowel sounds present, soft, nontender, nondistended, no masses or organomegaly. Morbidly obese. Musculoskeletal: normal, full range of motion in knees and hips, no swelling or tenderness.    Extremities: no cyanosis or edema. Peripheral Pulses: 2+ throughout, symetric. Neurologic: nonfocal, motor strength normal upper and lower extremities, sensory exam intact. Psych: normal affect, speech fluent. ASSESSMENT:   Diagnosis Orders   1. Type 2 diabetes mellitus without complication, unspecified whether long term insulin use (HCC)  escitalopram (LEXAPRO) 10 MG tablet    Hemoglobin Y7O    Basic Metabolic Panel   2. Morbid obesity (HCC)  escitalopram (LEXAPRO) 10 MG tablet    Hemoglobin L6G    Basic Metabolic Panel   3. 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 Essential hypertension  escitalopram (LEXAPRO) 10 MG tablet    Hemoglobin Q0T    Basic Metabolic Panel    ALT    AST         PLAN:  We discuss self control and how he struggles with setting limitations. I explain that the short-term benefit and relief one gets is similar to what an addict would get. We discuss the importance of self esteem and recognizing the inherent value that each of us have. He is currently doing yoga. He has stopped drinking soda. I commend him on his efforts and for the success that he has seen thus far. I don't recommend that he let his heart rate beyond 2/3 of his max for too long as this is more of an anaerobic level and can cause inflammation. I would like him to stay on the same dose of ozempic. I would like to increase the lexapro to 10 mg daily and see if we can get more improvement in sleep habits. I will see him back in 2 months. No orders of the defined types were placed in this encounter. No orders of the defined types were placed in this encounter. I, Dr. Lavell Junior, personally performed the services described in this documentation as scribed/transcribed by CIRO Brewer in my presence, and is both accurate and complete.

## 2022-03-28 DIAGNOSIS — R60.9 WATER RETENTION: ICD-10-CM

## 2022-03-28 RX ORDER — HYDROCHLOROTHIAZIDE 25 MG/1
25 TABLET ORAL EVERY MORNING
Qty: 90 TABLET | Refills: 1 | Status: SHIPPED | OUTPATIENT
Start: 2022-03-28 | End: 2022-09-26

## 2022-05-19 LAB
ALT SERPL-CCNC: 18 U/L (ref 0–40)
ANION GAP SERPL CALCULATED.3IONS-SCNC: 9 MMOL/L (ref 5–15)
AST SERPL-CCNC: 13 U/L (ref 0–41)
AVERAGE GLUCOSE: 131 MG/DL
BUN BLDV-MCNC: 12 MG/DL (ref 5–23)
CALCIUM SERPL-MCNC: 8.9 MG/DL (ref 8.5–10.5)
CHLORIDE BLD-SCNC: 100 MMOL/L (ref 98–109)
CO2: 28 MMOL/L (ref 22–32)
CREAT SERPL-MCNC: 0.64 MG/DL (ref 0.6–1.3)
EGFR AFRICAN AMERICAN: >60 ML/MIN/1.73SQ.M
EGFR IF NONAFRICAN AMERICAN: >60 ML/MIN/1.73SQ.M
GLUCOSE: 89 MG/DL (ref 65–99)
HBA1C MFR BLD: 6.2 % (ref 4.4–6.4)
POTASSIUM SERPL-SCNC: 3.7 MMOL/L (ref 3.5–5)
SODIUM BLD-SCNC: 137 MMOL/L (ref 134–146)

## 2022-05-24 ENCOUNTER — OFFICE VISIT (OUTPATIENT)
Dept: FAMILY MEDICINE CLINIC | Age: 52
End: 2022-05-24
Payer: COMMERCIAL

## 2022-05-24 VITALS
BODY MASS INDEX: 44.1 KG/M2 | DIASTOLIC BLOOD PRESSURE: 74 MMHG | HEIGHT: 71 IN | WEIGHT: 315 LBS | SYSTOLIC BLOOD PRESSURE: 118 MMHG

## 2022-05-24 DIAGNOSIS — E11.9 TYPE 2 DIABETES MELLITUS WITHOUT COMPLICATION, UNSPECIFIED WHETHER LONG TERM INSULIN USE (HCC): ICD-10-CM

## 2022-05-24 DIAGNOSIS — E78.5 HYPERLIPIDEMIA, UNSPECIFIED HYPERLIPIDEMIA TYPE: ICD-10-CM

## 2022-05-24 DIAGNOSIS — F41.8 ANXIETY WITH DEPRESSION: ICD-10-CM

## 2022-05-24 DIAGNOSIS — I10 ESSENTIAL HYPERTENSION: ICD-10-CM

## 2022-05-24 DIAGNOSIS — Z12.5 SCREENING PSA (PROSTATE SPECIFIC ANTIGEN): Primary | ICD-10-CM

## 2022-05-24 DIAGNOSIS — E66.01 MORBID OBESITY (HCC): ICD-10-CM

## 2022-05-24 PROCEDURE — 3044F HG A1C LEVEL LT 7.0%: CPT | Performed by: FAMILY MEDICINE

## 2022-05-24 PROCEDURE — 99214 OFFICE O/P EST MOD 30 MIN: CPT | Performed by: FAMILY MEDICINE

## 2022-05-24 RX ORDER — ESCITALOPRAM OXALATE 10 MG/1
10 TABLET ORAL DAILY
Qty: 30 TABLET | Refills: 3 | Status: SHIPPED | OUTPATIENT
Start: 2022-05-24

## 2022-05-24 RX ORDER — SEMAGLUTIDE 1.34 MG/ML
INJECTION, SOLUTION SUBCUTANEOUS
Qty: 1.5 ML | Refills: 3 | Status: SHIPPED | OUTPATIENT
Start: 2022-05-24 | End: 2022-09-02 | Stop reason: SDUPTHER

## 2022-05-24 RX ORDER — ROSUVASTATIN CALCIUM 5 MG/1
5 TABLET, COATED ORAL DAILY
Qty: 90 TABLET | Refills: 3 | Status: SHIPPED | OUTPATIENT
Start: 2022-05-24

## 2022-05-24 RX ORDER — LISINOPRIL 5 MG/1
TABLET ORAL
Qty: 90 TABLET | Refills: 3 | Status: SHIPPED | OUTPATIENT
Start: 2022-05-24

## 2022-05-24 NOTE — PATIENT INSTRUCTIONS
Plan:  - take Lexapro DAILY   - slow down when eating meals  - continue walking and staying hydrated

## 2022-05-24 NOTE — PROGRESS NOTES
I, Loi Mena [facility], am scribing for and in the presence of Dr. Emma Arechiga. 5/24/22/4:05/CRF    [address] [address]  [place] [id number]  Dept: [phone]    Tyshawn Harmon is a 45 y.o. male here for Anxiety and Diabetes      HPI:  Pt. Presents for 2 month f/u on weight management and anxiety. Taking Lexapro PRN     Prior to Admission medications    Medication Sig Start Date End Date Taking? Authorizing Provider   escitalopram (LEXAPRO) 10 MG tablet Take 1 tablet by mouth daily 5/24/22  Yes Emma Arechiga MD   Semaglutide,0.25 or 0.5MG/DOS, (OZEMPIC, 0.25 OR 0.5 MG/DOSE,) 2 MG/1.5ML SOPN DIAL AND INJECT UNDER THE SKIN 0.25 MG ONCE WEEKLY 5/24/22  Yes Emma Arechiga MD   lisinopril (PRINIVIL;ZESTRIL) 5 MG tablet TAKE 1 TABLET BY MOUTH DAILY 5/24/22  Yes Emma Arechiga MD   rosuvastatin (CRESTOR) 5 MG tablet Take 1 tablet by mouth daily 5/24/22  Yes Emma Arechiga MD   hydroCHLOROthiazide (HYDRODIURIL) 25 MG tablet Take 1 tablet by mouth every morning 3/28/22  Yes Emma Arechiga MD   metFORMIN (GLUCOPHAGE-XR) 500 MG extended release tablet Take 2 tablets by mouth 2 times daily  Patient taking differently: Take 1,000 mg by mouth every morning (before breakfast)  7/13/21  Yes Emma Arechiga MD   diclofenac sodium 1 % GEL Apply 2 g topically 2 times daily 3/5/19   Emma Arechiga MD       ROS:  General Constitutional: Denies chills. Denies fever. Denies headache. Denies lightheadedness. Ophthalmologic: Denies blurred vision. ENT: Denies nasal congestion. Denies sore throat. Denies ear pain and pressure. Respiratory: Denies cough. Denies shortness of breath. Denies wheezing. Cardiovascular: Denies chest pain at rest. Denies irregular heartbeat. Denies palpitations. Gastrointestinal: Denies abdominal pain. Denies blood in the stool. Denies constipation. Denies diarrhea. Denies nausea. Denies vomiting. Genitourinary: Denies blood in the urine.  Denies difficulty urinating. Denies frequent urination. Denies painful urination. Denies urinary incontinence. Musculoskeletal: Denies muscle aches. Denies painful joints. Denies swollen joints. Does yoga and walks    Peripheral Vascular: Denies pain/cramping in legs after exertion. Skin: Denies dry skin. Denies itching. Denies rash. Neurologic: Denies falls. Denies dizziness. Denies fainting. Denies tingling/numbness. Psychiatric: admits sleep disturbance. Admits  anxiety. Sometimes depressed mood. No suicidaltiy    No past surgical history on file. Family History   Problem Relation Age of Onset    High Blood Pressure Mother     High Cholesterol Mother     Emphysema Mother     Stroke Mother     Diabetes Father 48    Cancer Father     Other Sister         Smoker, chronic cough, SOB, 300+ lbs    Diabetes Paternal Uncle         Type 1    Cancer Paternal Uncle         Prostate    Heart Attack Paternal Grandfather     Diabetes Paternal Grandfather     Cataracts Paternal Grandfather        Past Medical History:   Diagnosis Date    Diabetes ([facility] [address] 2015    Hypertension     Obesity     Morbid    Venous insufficiency       Social History     Tobacco Use    Smoking status: Never Smoker    Smokeless tobacco: Never Used   Substance Use Topics    Alcohol use:  Yes     Alcohol/week: 0.0 standard drinks     Comment: occ, beer 1-2x/month      Current Outpatient Medications   Medication Sig Dispense Refill    escitalopram (LEXAPRO) 10 MG tablet Take 1 tablet by mouth daily 30 tablet 3    Semaglutide,0.25 or 0.5MG/DOS, (OZEMPIC, 0.25 OR 0.5 MG/DOSE,) 2 MG/1.5ML SOPN DIAL AND INJECT UNDER THE SKIN 0.25 MG ONCE WEEKLY 1.5 mL 3    lisinopril (PRINIVIL;ZESTRIL) 5 MG tablet TAKE 1 TABLET BY MOUTH DAILY 90 tablet 3    rosuvastatin (CRESTOR) 5 MG tablet Take 1 tablet by mouth daily 90 tablet 3    hydroCHLOROthiazide (HYDRODIURIL) 25 MG tablet Take 1 tablet by mouth every morning 90 tablet 1    metFORMIN (GLUCOPHAGE-XR) 500 MG extended release tablet Take 2 tablets by mouth 2 times daily (Patient taking differently: Take 1,000 mg by mouth every morning (before breakfast) ) 360 tablet 3    diclofenac sodium 1 % GEL Apply 2 g topically 2 times daily 1 Tube 3     No current facility-administered medications for this visit. No Known Allergies    PHYSICAL EXAM:    /74   Ht 5' 11\" (1.803 m)   Wt (!) 406 lb (184.2 kg)   BMI 56.63 kg/m²   Wt Readings from Last 3 Encounters:   05/24/22 (!) 406 lb (184.2 kg)   03/22/22 (!) 412 lb (186.9 kg)   01/18/22 (!) 433 lb (196.4 kg)     BP Readings from Last 3 Encounters:   05/24/22 118/74   03/22/22 122/70   01/18/22 118/76       General Appearance: in no acute distress, well developed, well nourished. Eyes: pupils equal, round reactive to light and accommodation. Ears: normal canal and TM's. Nose: nares patent, no lesions. Oral Cavity: mucosa moist.  Throat: clear. Neck/Thyroid: neck supple, full range of motion, no cervical lymphadenopathy, no thyromegaly or carotid bruits. Skin: warm and dry. No suspicious lesions. Heart: regular rate and rhythm. No murmurs. S1, S2 normal, no gallops. Reg 80  Lungs: clear to auscultation bilaterally. Abdomen: bowel sounds present, soft, nontender, nondistended, no masses or organomegaly. Morbidly obese   Musculoskeletal: normal, full range of motion in knees and hips, no swelling or tenderness. Extremities: no cyanosis or edema. Peripheral Pulses: 2+ throughout, symetric. Neurologic: nonfocal, motor strength normal upper and lower extremities, sensory exam intact. Psych: normal affect, speech fluent. Has good insight into his mood however we discussed his error in using lexapro prn    ASSESSMENT:   Diagnosis Orders   1. Screening PSA (prostate specific antigen)  PSA Screening   2.  Type 2 diabetes mellitus without complication, unspecified whether long term insulin use (HCC)  Hemoglobin A1C    Lipid Panel    PSA Screening    Basic Metabolic Panel    Microalbumin, Ur    escitalopram (LEXAPRO) 10 MG tablet   3. Morbid obesity (HCC)  escitalopram (LEXAPRO) 10 MG tablet   4. Anxiety with depression  escitalopram (LEXAPRO) 10 MG tablet   5. Essential hypertension  Hemoglobin A1C    Lipid Panel    PSA Screening    Basic Metabolic Panel    Microalbumin, Ur    escitalopram (LEXAPRO) 10 MG tablet    lisinopril (PRINIVIL;ZESTRIL) 5 MG tablet   6. Hyperlipidemia, unspecified hyperlipidemia type  Hemoglobin A1C    Lipid Panel    PSA Screening    Basic Metabolic Panel    Microalbumin, Ur       PLAN:  We review that his A1C is great now at 6.2, a year ago he was at 13.5. I ask what he has done differently. He is watching his diet closer. I remind him to eat slowly a meal should take a minimum of 20 minutes. He states he is only taking the Lexapro PRN. He is needing it 3-4 times per week. He says it is his job that stresses him out. I would like him to take the Lexapro daily. This will keep his level at a steady state and avoid the relapses    We discuss physical exercise , he is trying to walk more and is doing yoga regularly for the past several months.     Return in 3 months with labs   a1c micro lip psa bmp     Orders Placed This Encounter   Procedures    Hemoglobin A1C     Standing Status:   Future     Standing Expiration Date:   5/24/2023    Lipid Panel     Standing Status:   Future     Standing Expiration Date:   5/24/2023     Order Specific Question:   Is Patient Fasting?/# of Hours     Answer:   0    PSA Screening     Standing Status:   Future     Standing Expiration Date:   5/24/2023    Basic Metabolic Panel     Standing Status:   Future     Standing Expiration Date:   5/24/2023    Microalbumin, Ur     Standing Status:   Future     Standing Expiration Date:   5/24/2023     Orders Placed This Encounter   Medications    escitalopram (LEXAPRO) 10 MG tablet     Sig: Take 1 tablet by mouth daily     Dispense:  30 tablet     Refill:  3    Semaglutide,0.25 or 0.5MG/DOS, (OZEMPIC, 0.25 OR 0.5 MG/DOSE,) 2 MG/1.5ML SOPN     Sig: DIAL AND INJECT UNDER THE SKIN 0.25 MG ONCE WEEKLY     Dispense:  1.5 mL     Refill:  3    lisinopril (PRINIVIL;ZESTRIL) 5 MG tablet     Sig: TAKE 1 TABLET BY MOUTH DAILY     Dispense:  90 tablet     Refill:  3    rosuvastatin (CRESTOR) 5 MG tablet     Sig: Take 1 tablet by mouth daily     Dispense:  90 tablet     Refill:  3   I, Dr. Angel Whatley, personally performed the services described in this documentation as scribed/transcribed by STEPHAN Christopher in my presence, and is both accurate and complete.

## 2022-08-26 LAB
AVERAGE GLUCOSE: 151
AVERAGE GLUCOSE: 151 MG/DL
BUN BLDV-MCNC: 14 MG/DL
CALCIUM SERPL-MCNC: 9 MG/DL
CHLORIDE BLD-SCNC: 99 MMOL/L
CHOLESTEROL, TOTAL: 132 MG/DL
CHOLESTEROL/HDL RATIO: 2.8
CO2: 25 MMOL/L
CREAT SERPL-MCNC: 0.66 MG/DL
CREATININE URINE: 141.5 MG/DL
GFR CALCULATED: NORMAL
GLUCOSE BLD-MCNC: 139 MG/DL
HBA1C MFR BLD: 6.9 %
HBA1C MFR BLD: 6.9 % (ref 4.2–5.6)
HDLC SERPL-MCNC: 47 MG/DL (ref 35–70)
LDL CHOLESTEROL CALCULATED: 67 MG/DL (ref 0–160)
MICROALBUMIN/CREAT 24H UR: <12 MG/G{CREAT}
NONHDLC SERPL-MCNC: NORMAL MG/DL
POTASSIUM SERPL-SCNC: 4.2 MMOL/L
PROSTATE SPECIFIC ANTIGEN: 0.41 NG/ML
SODIUM BLD-SCNC: 135 MMOL/L
TRIGL SERPL-MCNC: 92 MG/DL
VLDLC SERPL CALC-MCNC: 18 MG/DL

## 2022-08-27 LAB
ANION GAP SERPL CALCULATED.3IONS-SCNC: 11 MEQ/L (ref 7–16)
BUN BLDV-MCNC: 14 MG/DL (ref 6–20)
CALCIUM SERPL-MCNC: 9 MG/DL (ref 8.5–10.5)
CHLORIDE BLD-SCNC: 99 MEQ/L (ref 95–107)
CHOLESTEROL/HDL RATIO: 2.8 RATIO
CHOLESTEROL: 132 MG/DL
CO2: 25 MEQ/L (ref 19–31)
CREAT SERPL-MCNC: 0.66 MG/DL (ref 0.8–1.4)
CREATINE, URINE: 141.5 MG/DL
EGFR IF NONAFRICAN AMERICAN: 111 ML/MIN/1.73
GLUCOSE: 139 MG/DL (ref 70–99)
HDLC SERPL-MCNC: 47 MG/DL
LDL CHOLESTEROL CALCULATED: 67 MG/DL
LDL/HDL RATIO: 1.4 RATIO
MICROALBUMIN/CREAT 24H UR: <12 MG/DL
MICROALBUMIN/CREAT UR-RTO: NORMAL MG/G
POTASSIUM SERPL-SCNC: 4.2 MEQ/L (ref 3.5–5.4)
PSA, ULTRASENSITIVE: 0.41 NG/ML
SODIUM BLD-SCNC: 135 MEQ/L (ref 133–146)
TRIGL SERPL-MCNC: 92 MG/DL
VLDLC SERPL CALC-MCNC: 18 MG/DL

## 2022-09-02 ENCOUNTER — OFFICE VISIT (OUTPATIENT)
Dept: PRIMARY CARE CLINIC | Age: 52
End: 2022-09-02
Payer: COMMERCIAL

## 2022-09-02 VITALS
RESPIRATION RATE: 16 BRPM | HEIGHT: 71 IN | WEIGHT: 315 LBS | BODY MASS INDEX: 44.1 KG/M2 | OXYGEN SATURATION: 97 % | SYSTOLIC BLOOD PRESSURE: 110 MMHG | TEMPERATURE: 97.7 F | DIASTOLIC BLOOD PRESSURE: 70 MMHG | HEART RATE: 82 BPM

## 2022-09-02 DIAGNOSIS — R07.9 CHEST PAIN, UNSPECIFIED TYPE: ICD-10-CM

## 2022-09-02 DIAGNOSIS — E11.9 TYPE 2 DIABETES MELLITUS WITHOUT COMPLICATION, UNSPECIFIED WHETHER LONG TERM INSULIN USE (HCC): ICD-10-CM

## 2022-09-02 DIAGNOSIS — I10 ESSENTIAL HYPERTENSION: ICD-10-CM

## 2022-09-02 DIAGNOSIS — E66.01 MORBID OBESITY (HCC): Primary | ICD-10-CM

## 2022-09-02 DIAGNOSIS — E78.5 HYPERLIPIDEMIA, UNSPECIFIED HYPERLIPIDEMIA TYPE: ICD-10-CM

## 2022-09-02 DIAGNOSIS — Z12.5 SCREENING PSA (PROSTATE SPECIFIC ANTIGEN): ICD-10-CM

## 2022-09-02 PROCEDURE — 3044F HG A1C LEVEL LT 7.0%: CPT | Performed by: NURSE PRACTITIONER

## 2022-09-02 PROCEDURE — 99214 OFFICE O/P EST MOD 30 MIN: CPT | Performed by: NURSE PRACTITIONER

## 2022-09-02 RX ORDER — SEMAGLUTIDE 1.34 MG/ML
INJECTION, SOLUTION SUBCUTANEOUS
Qty: 4 ML | Refills: 3 | Status: SHIPPED | OUTPATIENT
Start: 2022-09-02 | End: 2022-10-17 | Stop reason: SDUPTHER

## 2022-09-02 SDOH — ECONOMIC STABILITY: FOOD INSECURITY: WITHIN THE PAST 12 MONTHS, YOU WORRIED THAT YOUR FOOD WOULD RUN OUT BEFORE YOU GOT MONEY TO BUY MORE.: NEVER TRUE

## 2022-09-02 SDOH — ECONOMIC STABILITY: FOOD INSECURITY: WITHIN THE PAST 12 MONTHS, THE FOOD YOU BOUGHT JUST DIDN'T LAST AND YOU DIDN'T HAVE MONEY TO GET MORE.: NEVER TRUE

## 2022-09-02 ASSESSMENT — PATIENT HEALTH QUESTIONNAIRE - PHQ9
2. FEELING DOWN, DEPRESSED OR HOPELESS: 0
8. MOVING OR SPEAKING SO SLOWLY THAT OTHER PEOPLE COULD HAVE NOTICED. OR THE OPPOSITE, BEING SO FIGETY OR RESTLESS THAT YOU HAVE BEEN MOVING AROUND A LOT MORE THAN USUAL: 0
SUM OF ALL RESPONSES TO PHQ QUESTIONS 1-9: 0
10. IF YOU CHECKED OFF ANY PROBLEMS, HOW DIFFICULT HAVE THESE PROBLEMS MADE IT FOR YOU TO DO YOUR WORK, TAKE CARE OF THINGS AT HOME, OR GET ALONG WITH OTHER PEOPLE: 0
6. FEELING BAD ABOUT YOURSELF - OR THAT YOU ARE A FAILURE OR HAVE LET YOURSELF OR YOUR FAMILY DOWN: 0
SUM OF ALL RESPONSES TO PHQ QUESTIONS 1-9: 0
9. THOUGHTS THAT YOU WOULD BE BETTER OFF DEAD, OR OF HURTING YOURSELF: 0
SUM OF ALL RESPONSES TO PHQ9 QUESTIONS 1 & 2: 0
SUM OF ALL RESPONSES TO PHQ QUESTIONS 1-9: 0
1. LITTLE INTEREST OR PLEASURE IN DOING THINGS: 0
7. TROUBLE CONCENTRATING ON THINGS, SUCH AS READING THE NEWSPAPER OR WATCHING TELEVISION: 0
3. TROUBLE FALLING OR STAYING ASLEEP: 0
SUM OF ALL RESPONSES TO PHQ QUESTIONS 1-9: 0
5. POOR APPETITE OR OVEREATING: 0
4. FEELING TIRED OR HAVING LITTLE ENERGY: 0

## 2022-09-02 ASSESSMENT — SOCIAL DETERMINANTS OF HEALTH (SDOH): HOW HARD IS IT FOR YOU TO PAY FOR THE VERY BASICS LIKE FOOD, HOUSING, MEDICAL CARE, AND HEATING?: NOT HARD AT ALL

## 2022-09-02 NOTE — PROGRESS NOTES
Name: Noemí Reich  : 1970         Chief Complaint:     Chief Complaint   Patient presents with    Anxiety     Patient is taking lexapro 10 mg daily for his anxiety. Denies any issues. Diabetes     Patient states he is still taking ozempic and likes it.would like to know if he is supposed to increase the medication at this time. Not following any diet, denies any physical activities. History of Present Illness:      Noemí Reich is a 45 y.o.  male who presents with Anxiety (Patient is taking lexapro 10 mg daily for his anxiety. Denies any issues. ) and Diabetes (Patient states he is still taking ozempic and likes it.would like to know if he is supposed to increase the medication at this time. Not following any diet, denies any physical activities. )      HPI    Hypertension:  Current medication regimen includes lisinopril 5 mg daily and hydrochlorothiazide 25 mg daily. He denies monitoring his blood pressure at home. At-home blood pressure is averaging N/A. He denies shortness of breath, headache, vision changes, palpitations, lightheadedness, or dizziness. He admits chest pain daily for several years (3-4 years). Chest pains occurs with activity. He admits having 2 episodes of chest pain daily. He has never spoken about this to any provider in the past. Denies family history of CAD. DM:  Diabetic diet/low carb diet compliance: Noncompliant   Current exercise: light walking  Frequency of exercise: daily  Frequency of glucose testing at home: Never  Home blood sugar records: N/A  Glucometer at home: yes, \"but its old\"  History of hypoglycemic episodes: not recently   Last eye exam: several years ago    reports that he has never smoked.  He has never used smokeless tobacco.   Medication compliance:  compliant all of the time  Medication Therapy: Metformin 1000 mg daily and Ozempic 0.25 mg once weekly  Hemoglobin A1C (%)   Date Value   2022 6.9   2022 6.2   2022 7.2 10/13/2021 10.6   08/12/2021 11.0 (H)        Past Medical History:     Past Medical History:   Diagnosis Date    Diabetes ([facility] [address] 2015    Hypertension     Obesity     Morbid    Venous insufficiency       Reviewed all health maintenance requirements and ordered appropriate tests  Health Maintenance Due   Topic Date Due    HIV screen  Never done    Hepatitis C screen  Never done    Hepatitis B vaccine (1 of 3 - Risk 3-dose series) Never done    DTaP/Tdap/Td vaccine (1 - Tdap) Never done    Colorectal Cancer Screen  Never done    Shingles vaccine (1 of 2) Never done    Diabetic foot exam  03/05/2020    Diabetic retinal exam  06/15/2021    Flu vaccine (1) 09/01/2022       Past Surgical History:     No past surgical history on file. Medications:       Prior to Admission medications    Medication Sig Start Date End Date Taking? Authorizing Provider   Semaglutide,0.25 or 0.5MG/DOS, (OZEMPIC, 0.25 OR 0.5 MG/DOSE,) 2 MG/1.5ML SOPN DIAL AND INJECT UNDER THE SKIN 0.5 MG ONCE WEEKLY 9/2/22  Yes GLENDY Lundy CNP   escitalopram (LEXAPRO) 10 MG tablet Take 1 tablet by mouth daily 5/24/22  Yes Josefa Holt MD   lisinopril (PRINIVIL;ZESTRIL) 5 MG tablet TAKE 1 TABLET BY MOUTH DAILY 5/24/22  Yes Josefa Holt MD   rosuvastatin (CRESTOR) 5 MG tablet Take 1 tablet by mouth daily 5/24/22  Yes Josefa Holt MD   hydroCHLOROthiazide (HYDRODIURIL) 25 MG tablet Take 1 tablet by mouth every morning 3/28/22  Yes Josefa Holt MD   metFORMIN (GLUCOPHAGE-XR) 500 MG extended release tablet Take 2 tablets by mouth 2 times daily  Patient taking differently: Take 1,000 mg by mouth every morning (before breakfast) 7/13/21  Yes Josefa Holt MD        Allergies:       Patient has no known allergies. Social History:     Tobacco:    reports that he has never smoked. He has never used smokeless tobacco.  Alcohol:      reports current alcohol use. Drug Use:  reports no history of drug use.     Family History:     Family History Problem Relation Age of Onset    High Blood Pressure Mother     High Cholesterol Mother     Emphysema Mother     Stroke Mother     Diabetes Father 48    Cancer Father     Other Sister         Smoker, chronic cough, SOB, 300+ lbs    Diabetes Paternal Uncle         Type 1    Cancer Paternal Uncle         Prostate    Heart Attack Paternal Grandfather     Diabetes Paternal Grandfather     Cataracts Paternal Grandfather        Review of Systems:     Positive and Negative as described in HPI    Review of Systems   Eyes:  Negative for visual disturbance. Cardiovascular:  Positive for chest pain. Negative for palpitations. Neurological:  Negative for dizziness, light-headedness and headaches. Physical Exam:   Vitals:  /70   Pulse 82   Temp 97.7 °F (36.5 °C)   Resp 16   Ht 5' 11\" (1.803 m)   Wt (!) 410 lb (186 kg)   SpO2 97%   BMI 57.18 kg/m²     Physical Exam  Constitutional:       General: He is not in acute distress. Appearance: Normal appearance. He is obese. He is not ill-appearing or toxic-appearing. HENT:      Head: Normocephalic. Cardiovascular:      Rate and Rhythm: Normal rate and regular rhythm. Heart sounds: Normal heart sounds. No murmur heard. Pulmonary:      Effort: Pulmonary effort is normal.      Breath sounds: Normal breath sounds. Neurological:      Mental Status: He is alert and oriented to person, place, and time. Psychiatric:         Mood and Affect: Mood normal.         Thought Content:  Thought content normal.         Judgment: Judgment normal.       Data:     Lab Results   Component Value Date/Time     08/26/2022 12:00 AM    K 4.2 08/26/2022 12:00 AM    CL 99 08/26/2022 12:00 AM    CO2 25 08/26/2022 12:00 AM    BUN 14 08/26/2022 12:00 AM    CREATININE 0.66 08/26/2022 12:00 AM    GLUCOSE 139 08/26/2022 12:00 AM    GLUCOSE 89 05/18/2022 04:15 PM    PROT 7.2 05/26/2017 03:58 PM    LABALBU 4.2 05/26/2017 03:58 PM    BILITOT 0.43 05/26/2017 03:58 PM    ALKPHOS 52 05/26/2017 03:58 PM    AST 13 05/18/2022 04:15 PM    ALT 18 05/18/2022 04:15 PM     Lab Results   Component Value Date/Time    WBC 8.9 08/12/2021 08:55 AM    WBC 13.7 05/09/2019 10:25 AM    RBC 5.42 08/12/2021 08:55 AM    HGB 15.8 08/12/2021 08:55 AM    HCT 47.0 08/12/2021 08:55 AM    MCV 87 08/12/2021 08:55 AM    MCH 29.3 08/12/2021 08:55 AM    MCHC 33.7 08/12/2021 08:55 AM    RDW 12.8 08/12/2021 08:55 AM     08/12/2021 08:55 AM     05/09/2019 10:25 AM    MPV 8.6 08/12/2021 08:55 AM     Lab Results   Component Value Date/Time    TSH 2.34 08/12/2021 08:55 AM     Lab Results   Component Value Date/Time    CHOL 132 08/26/2022 12:00 AM    HDL 47 08/26/2022 12:00 AM    PSA 0.41 08/26/2022 12:00 AM    LABA1C 6.9 08/26/2022 12:00 AM       Assessment/Plan:      Diagnosis Orders   1. Morbid obesity ([address]        2. Chest pain, unspecified type  EKG 12 lead    Jessica Cosme MD, Cardiology, [place]    Stress test, lexiscan      3. Type 2 diabetes mellitus without complication, unspecified whether long term insulin use ([address]        4. Essential hypertension            Chest pain:  - Patient confirms he is not having any chest pain today. - Complete ancillary EKG  - Lexiscan ordered for completion  - Continue rosuvastatin 5 mg daily  - He does have risk factors for CAD including obesity, hypertension, DM type II  - Follow-up 2 weeks  - Counseled at length regarding signs and symptoms of chest pain and to present to the emergency department. We discussed this can be a medical emergency and he is to seek immediate care if he does experience chest pain.   - Referral to cardiology [address] placed today    DM:  - Reviewed most recent A1c 6.9%  - Continue metformin 1000 mg daily  - Increase Ozempic from 0.25 mg to 0.5 mg once weekly   - Order for new glucometer placed today   - Counseled on diabetic diet and routine physical activity  - Offered referral to dietitian and/or diabetes education, patient declines  - Repeat A1c in 3 months in office    Completed Refills   Requested Prescriptions     Signed Prescriptions Disp Refills    Semaglutide,0.25 or 0.5MG/DOS, (OZEMPIC, 0.25 OR 0.5 MG/DOSE,) 2 MG/1.5ML SOPN 4 mL 3     Sig: DIAL AND INJECT UNDER THE SKIN 0.5 MG ONCE WEEKLY       Orders Placed This Encounter   Procedures    [id number] [address]  Kavya Carver MD, Cardiology, [place]     Referral Priority:   Routine     Referral Type:   Eval and Treat     Referral Reason:   Specialty Services Required     Requested Specialty:   Cardiology     Number of Visits Requested:   1    Stress test, lexiscan     Please schedule as soon as possible     Standing Status:   Future     Standing Expiration Date:   9/2/2023     Order Specific Question:   Reason for Exam?     Answer:   Chest pain    EKG 12 lead     Standing Status:   Future     Standing Expiration Date:   11/1/2022     Order Specific Question:   Reason for Exam?     Answer:   Chest pain        No results found for this visit on 09/02/22. Return in about 2 weeks (around 9/16/2022), or if symptoms worsen or fail to improve, for chest pain f/u + discuss results .     Electronically signed by GLENDY Espino CNP on 09/05/22 at 4:21 PM.

## 2022-09-02 NOTE — PATIENT INSTRUCTIONS
SURVEY:    You may be receiving a survey from [employer] regarding your visit today. Please complete the survey to enable us to provide the highest quality of care to you and your family. If you cannot score us a very good on any question, please call the office to discuss how we could of made your experience a very good one. Thank you.

## 2022-09-12 ENCOUNTER — TELEPHONE (OUTPATIENT)
Dept: FAMILY MEDICINE CLINIC | Age: 52
End: 2022-09-12

## 2022-09-12 NOTE — TELEPHONE ENCOUNTER
Yani Zavala was contacted by Huma Morocho, colby Elaine, regarding a Social Determinants of Health referral.     A message was left with the writer's contact information. First contact attempt.

## 2022-09-13 ENCOUNTER — CARE COORDINATION (OUTPATIENT)
Dept: CARE COORDINATION | Age: 52
End: 2022-09-13

## 2022-09-13 SDOH — ECONOMIC STABILITY: HOUSING INSECURITY: IN THE LAST 12 MONTHS, HOW MANY PLACES HAVE YOU LIVED?: 2

## 2022-09-13 SDOH — ECONOMIC STABILITY: TRANSPORTATION INSECURITY
IN THE PAST 12 MONTHS, HAS LACK OF TRANSPORTATION KEPT YOU FROM MEETINGS, WORK, OR FROM GETTING THINGS NEEDED FOR DAILY LIVING?: NO

## 2022-09-13 SDOH — HEALTH STABILITY: PHYSICAL HEALTH: ON AVERAGE, HOW MANY DAYS PER WEEK DO YOU ENGAGE IN MODERATE TO STRENUOUS EXERCISE (LIKE A BRISK WALK)?: 2 DAYS

## 2022-09-13 SDOH — ECONOMIC STABILITY: TRANSPORTATION INSECURITY
IN THE PAST 12 MONTHS, HAS THE LACK OF TRANSPORTATION KEPT YOU FROM MEDICAL APPOINTMENTS OR FROM GETTING MEDICATIONS?: NO

## 2022-09-13 SDOH — HEALTH STABILITY: PHYSICAL HEALTH: ON AVERAGE, HOW MANY MINUTES DO YOU ENGAGE IN EXERCISE AT THIS LEVEL?: 30 MIN

## 2022-09-13 SDOH — ECONOMIC STABILITY: HOUSING INSECURITY
IN THE LAST 12 MONTHS, WAS THERE A TIME WHEN YOU DID NOT HAVE A STEADY PLACE TO SLEEP OR SLEPT IN A SHELTER (INCLUDING NOW)?: NO

## 2022-09-13 SDOH — ECONOMIC STABILITY: INCOME INSECURITY: IN THE LAST 12 MONTHS, WAS THERE A TIME WHEN YOU WERE NOT ABLE TO PAY THE MORTGAGE OR RENT ON TIME?: NO

## 2022-09-13 ASSESSMENT — LIFESTYLE VARIABLES: HOW OFTEN DO YOU HAVE A DRINK CONTAINING ALCOHOL: NEVER

## 2022-09-13 ASSESSMENT — SOCIAL DETERMINANTS OF HEALTH (SDOH): HOW HARD IS IT FOR YOU TO PAY FOR THE VERY BASICS LIKE FOOD, HOUSING, MEDICAL CARE, AND HEATING?: SOMEWHAT HARD

## 2022-09-13 NOTE — CARE COORDINATION
Spoke with patient. PCP referred him for care management and also to community health worker for SDOH needs. He received call from [facility] but has been sick with a cold since Sunday. Stated he has her phone number and will call her back in a day or two. He was supposed to get a stress test done but canceled, can't afford the copay. Also has appt scheduled with cardiologist 9/27 but will cancel that, doesn't feeel has a reason to go if doesn't get stress test done. No chest pain except with the coughing. All medications reviewed, he is affording copays. He drives. Lives by himself and just moved to a new apartment and has to unpack yet. He is employed, had to call off work yesterday and today due to his cough. Symptoms of cough, congestion, fatigue, \"stuffy head\". Only expectorating small amts clear/white phlegm. Can't sleep at night due to coughing but plans to sleep in recliner tonight which may help. He is taking Robitussin DM. He doesn't know about a fever but feels cold/hot and sweaty sometimes. Did home COVID test which was negative. He is drinking ok, no n/v/d. DM- hasn't checked blood sugar lately. On Ozempic 0.25 mg weekly, will start 0.5 mg dose soon. He declined care management enrollment at this time. Encouraged him to keep my contact phone number for future needs or concerns, call Amaury Gonzalez the CHW back. Instructed him to increase fluids, call PCP or send [employer] message for symptoms, go to ED for worse dyspnea or chest pain.     Future Appointments   Date Time Provider Thad Barbour   9/27/2022  3:20 PM [facility]   12/5/2022  3:40 PM GLEND[facility] Jarvis - [facility]

## 2022-09-14 NOTE — TELEPHONE ENCOUNTER
Larissa Lockwood was contacted by a [employer] to discuss a referral for SDOH related needs. Writer spoke with: Patient and explained the services and assistance that can be provided through the [employer] program.     Patient agreeable to receiving resources and support from [address] [place] [address]. Intake Notes: Pt states he is supposed to have a stress test, which is $500 (his deductible) and he cannot afford that. Was offered a 20% discount if he paid it in full, but he cannot afford that. Plan of Care:  Pt's income exceeds the limit for Medicaid. The best option available may be a payment plan, which [facility] offers.     Action steps to be completed by writer:  provided phone number for [facility] billing to discuss payment plan    Action steps to be completed by patient:  call [facility] billing    Patient has given verbal permission to leave detailed messages regarding SDOH referral on their phone: N/A    Patient has given verbal permission to submit applications on their behalf: N/A    Patient voiced understanding of action plan and responsibilities, was provided with writer's contact information, and agrees to call should they require additional assistance: yes      Marina Santo

## 2022-10-17 RX ORDER — SEMAGLUTIDE 1.34 MG/ML
INJECTION, SOLUTION SUBCUTANEOUS
Qty: 4 ML | Refills: 3 | Status: SHIPPED | OUTPATIENT
Start: 2022-10-17

## 2022-10-17 RX ORDER — METFORMIN HYDROCHLORIDE 500 MG/1
1000 TABLET, EXTENDED RELEASE ORAL 2 TIMES DAILY WITH MEALS
Qty: 360 TABLET | Refills: 3 | Status: SHIPPED | OUTPATIENT
Start: 2022-10-17

## 2022-12-05 ENCOUNTER — OFFICE VISIT (OUTPATIENT)
Dept: PRIMARY CARE CLINIC | Age: 52
End: 2022-12-05
Payer: COMMERCIAL

## 2022-12-05 VITALS
RESPIRATION RATE: 18 BRPM | OXYGEN SATURATION: 98 % | DIASTOLIC BLOOD PRESSURE: 80 MMHG | SYSTOLIC BLOOD PRESSURE: 130 MMHG | HEART RATE: 55 BPM | TEMPERATURE: 97.4 F | BODY MASS INDEX: 44.1 KG/M2 | HEIGHT: 71 IN | WEIGHT: 315 LBS

## 2022-12-05 DIAGNOSIS — E11.9 TYPE 2 DIABETES MELLITUS WITHOUT COMPLICATION, UNSPECIFIED WHETHER LONG TERM INSULIN USE (HCC): Primary | ICD-10-CM

## 2022-12-05 DIAGNOSIS — E78.5 HYPERLIPIDEMIA, UNSPECIFIED HYPERLIPIDEMIA TYPE: ICD-10-CM

## 2022-12-05 DIAGNOSIS — E66.9 OBESITY, UNSPECIFIED CLASSIFICATION, UNSPECIFIED OBESITY TYPE, UNSPECIFIED WHETHER SERIOUS COMORBIDITY PRESENT: ICD-10-CM

## 2022-12-05 DIAGNOSIS — F19.20 ADDICTION (HCC): ICD-10-CM

## 2022-12-05 DIAGNOSIS — I10 ESSENTIAL HYPERTENSION: ICD-10-CM

## 2022-12-05 DIAGNOSIS — E66.01 MORBID OBESITY (HCC): ICD-10-CM

## 2022-12-05 LAB — HBA1C MFR BLD: 9.7 %

## 2022-12-05 PROCEDURE — 83036 HEMOGLOBIN GLYCOSYLATED A1C: CPT | Performed by: NURSE PRACTITIONER

## 2022-12-05 PROCEDURE — 3074F SYST BP LT 130 MM HG: CPT | Performed by: NURSE PRACTITIONER

## 2022-12-05 PROCEDURE — 3046F HEMOGLOBIN A1C LEVEL >9.0%: CPT | Performed by: NURSE PRACTITIONER

## 2022-12-05 PROCEDURE — 3078F DIAST BP <80 MM HG: CPT | Performed by: NURSE PRACTITIONER

## 2022-12-05 PROCEDURE — 99214 OFFICE O/P EST MOD 30 MIN: CPT | Performed by: NURSE PRACTITIONER

## 2022-12-05 RX ORDER — SEMAGLUTIDE 1.34 MG/ML
1 INJECTION, SOLUTION SUBCUTANEOUS WEEKLY
Qty: 4 ML | Refills: 3 | Status: SHIPPED | OUTPATIENT
Start: 2022-12-05

## 2022-12-05 RX ORDER — METFORMIN HYDROCHLORIDE 500 MG/1
1000 TABLET, EXTENDED RELEASE ORAL 2 TIMES DAILY WITH MEALS
Qty: 360 TABLET | Refills: 3 | Status: SHIPPED | OUTPATIENT
Start: 2022-12-05 | End: 2023-11-30

## 2022-12-05 ASSESSMENT — ENCOUNTER SYMPTOMS: SHORTNESS OF BREATH: 0

## 2022-12-05 NOTE — PROGRESS NOTES
Name: Kb El  : 1970         Chief Complaint:     Chief Complaint   Patient presents with    Hypertension     Current medication regimen includes lisinopril 5 mg daily and hydrochlorothiazide 25 mg daily. He denies monitoring his blood pressure at home. At-home blood pressure is averaging N/A. He denies shortness of breath, headache, vision changes, palpitations, lightheadedness, or dizziness    Diabetes     Diabetic diet/low carb diet compliance: Noncompliant   Current exercise: none  Frequency of exercise: none  Frequency of glucose testing at home: Never  Home blood sugar records: N/A  Glucometer at home: yes, \"but its old\"  History of hypoglycemic episodes: not recently   Last eye exam: several years ago    reports that he has never smoked. He has never used smokeless tobacco.   Medication compliance:  compliant all of the time  Medication Therapy: Metformin 1000 mg daily and Ozempic 0.25 mg        History of Present Illness:      Kb El is a 45 y.o.  male who presents with Hypertension (Current medication regimen includes lisinopril 5 mg daily and hydrochlorothiazide 25 mg daily. He denies monitoring his blood pressure at home. At-home blood pressure is averaging N/A. He denies shortness of breath, headache, vision changes, palpitations, lightheadedness, or dizziness) and Diabetes (Diabetic diet/low carb diet compliance: Noncompliant /Current exercise: none/Frequency of exercise: none/Frequency of glucose testing at home: Never/Home blood sugar records: N/A/Glucometer at home: yes, \"but its old\"/History of hypoglycemic episodes: not recently /Last eye exam: several years ago / reports that he has never smoked. He has never used smokeless tobacco. /Medication compliance:  compliant all of the time/Medication Therapy: Metformin 1000 mg daily and Ozempic 0.25 mg )      HPI    Hypertension:  Current medication regimen includes lisinopril 5 mg daily and hydrochlorothiazide 25 mg daily.   He denies monitoring blood pressure at home. At-home blood pressure averaging N/A. He denies shortness of breath, headache, vision changes, palpitations, lightheadedness, or dizziness. DM:  Diabetic diet/low carb diet compliance:  noncompliant  Current exercise: none  Frequency of exercise: N/A  Frequency of glucose testing at home: never   Home blood sugar records: N/A  Glucometer at home: Yes, \"but its old\"  History of hypoglycemic episodes: none recent  Last eye exam: several years ago   Last diabetic foot check: today 12/5/22    reports that he has never smoked. He has never used smokeless tobacco.   Medication compliance:  compliant all of the time  Medication Therapy: Metformin 1000 mg QD with breakfast, Ozempic 0.5mg once weekly   Hemoglobin A1C (%)   Date Value   12/05/2022 9.7   08/26/2022 6.9 (H)   08/26/2022 6.9   05/18/2022 6.2   01/18/2022 7.2      Hyperlipidemia:  Current treatment includes rosuvastatin 5mg QD. Lipid panel 8/26/2022 total cholesterol 132, LDL 67.    Past Medical History:     Past Medical History:   Diagnosis Date    Diabetes ([facility] [address] 2015    Hypertension     Obesity     Morbid    Venous insufficiency       Reviewed all health maintenance requirements and ordered appropriate tests  Health Maintenance Due   Topic Date Due    HIV screen  Never done    Hepatitis C screen  Never done    Hepatitis B vaccine (1 of 3 - Risk 3-dose series) Never done    DTaP/Tdap/Td vaccine (1 - Tdap) Never done    Colorectal Cancer Screen  Never done    Shingles vaccine (1 of 2) Never done    Diabetic retinal exam  06/15/2021    Flu vaccine (1) 08/01/2022       Past Surgical History:     No past surgical history on file. Medications:       Prior to Admission medications    Medication Sig Start Date End Date Taking?  Authorizing Provider   [facility] ([address] [facility]) MISC Dispense 1 12/5/22  Yes Juan Cameron, APRN - CNP   metFORMIN (GLUCOPHAGE-XR) 500 MG extended release tablet Take 2 tablets by mouth 2 times daily (with meals) 12/5/22 11/30/23 Yes GLENDY Mensah - CNP   Semaglutide, 1 MG/DOSE, (OZEMPIC, 1 MG/DOSE,) 4 MG/3ML SOPN Inject 1 mg into the skin once a week 12/5/22  Yes GLENDY Mensah - MALVIN   hydroCHLOROthiazide (HYDRODIURIL) 25 MG tablet TAKE ONE TABLET BY MOUTH EVERY MORNING 9/26/22  Yes GLENDY Mensah CNP   blood glucose monitor kit and supplies Dispense sufficient amount for indicated testing frequency plus additional to accommodate PRN testing needs. Dispense all needed supplies to include: monitor, strips, lancing device, lancets, control solutions, alcohol swabs. 9/5/22  Yes GLENDY Mensah CNP   escitalopram (LEXAPRO) 10 MG tablet Take 1 tablet by mouth daily 5/24/22  Yes Charity Soni MD   lisinopril (PRINIVIL;ZESTRIL) 5 MG tablet TAKE 1 TABLET BY MOUTH DAILY 5/24/22  Yes Charity Soni MD   rosuvastatin (CRESTOR) 5 MG tablet Take 1 tablet by mouth daily 5/24/22  Yes Charity Soni MD        Allergies:       Patient has no known allergies. Social History:     Tobacco:    reports that he has never smoked. He has never used smokeless tobacco.  Alcohol:      reports current alcohol use. Drug Use:  reports no history of drug use. Family History:     Family History   Problem Relation Age of Onset    High Blood Pressure Mother     High Cholesterol Mother     Emphysema Mother     Stroke Mother     Diabetes Father 48    Cancer Father     Other Sister         Smoker, chronic cough, SOB, 300+ lbs    Diabetes Paternal Uncle         Type 1    Cancer Paternal Uncle         Prostate    Heart Attack Paternal Grandfather     Diabetes Paternal Grandfather     Cataracts Paternal Grandfather        Review of Systems:     Positive and Negative as described in HPI    Review of Systems   HENT:          Admits pulling out his right molar several years ago. He has intermittent bleeding occurring from that section in his gum. Denies routine dental appointments.    Eyes:  Negative for visual disturbance. Respiratory:  Negative for shortness of breath. Cardiovascular:  Negative for palpitations. Admits bilateral leg swelling, equal on both sides. Symptoms are worse when he is sitting at a desk. He admits high salt diet with chips and frozen meals. He does not wear compression stockings. Neurological:  Negative for dizziness, light-headedness and headaches. Physical Exam:   Vitals:  /80   Pulse 55   Temp 97.4 °F (36.3 °C)   Resp 18   Ht 5' 11\" (1.803 m)   Wt (!) 416 lb (188.7 kg)   SpO2 98%   BMI 58.02 kg/m²     Physical Exam  Constitutional:       General: He is not in acute distress. Appearance: Normal appearance. He is obese. He is not ill-appearing or toxic-appearing. HENT:      Mouth/Throat:      Comments: Right mandible molar with one half tooth absent  Cardiovascular:      Rate and Rhythm: Normal rate and regular rhythm. Pulses:           Dorsalis pedis pulses are 2+ on the right side. Posterior tibial pulses are 2+ on the right side and 2+ on the left side. Heart sounds: Normal heart sounds. No murmur heard. Pulmonary:      Effort: Pulmonary effort is normal. No respiratory distress. Breath sounds: Normal breath sounds. No stridor. No wheezing, rhonchi or rales. Musculoskeletal:      Comments: 1+ pitting edema bilaterally with significant stasis skin changes to lower extremities bilaterally. No erythema or warmth to lower extremities bilaterally. Feet:      Right foot:      Protective Sensation: 6 sites tested. 6 sites sensed. Skin integrity: No ulcer, blister, skin breakdown, erythema, warmth, callus, dry skin or fissure. Toenail Condition: Right toenails are long. Left foot:      Protective Sensation: 6 sites tested. 6 sites sensed. Skin integrity: No ulcer, blister, skin breakdown, erythema, warmth, callus, dry skin or fissure. Toenail Condition: Left toenails are long.       Comments: WNL monofilament and vibratory sense WNL  Neurological:      Mental Status: He is alert. Psychiatric:         Mood and Affect: Mood normal.         Behavior: Behavior normal.         Thought Content: Thought content normal.         Judgment: Judgment normal.       Data:     Lab Results   Component Value Date/Time     08/26/2022 07:45 AM    K 4.2 08/26/2022 07:45 AM    CL 99 08/26/2022 07:45 AM    CO2 25 08/26/2022 07:45 AM    BUN 14 08/26/2022 07:45 AM    CREATININE 0.66 08/26/2022 07:45 AM    GLUCOSE 139 08/26/2022 07:45 AM    PROT 7.2 05/26/2017 03:58 PM    LABALBU 4.2 05/26/2017 03:58 PM    BILITOT 0.43 05/26/2017 03:58 PM    ALKPHOS 52 05/26/2017 03:58 PM    AST 13 05/18/2022 04:15 PM    ALT 18 05/18/2022 04:15 PM     Lab Results   Component Value Date/Time    WBC 8.9 08/12/2021 08:55 AM    WBC 13.7 05/09/2019 10:25 AM    RBC 5.42 08/12/2021 08:55 AM    HGB 15.8 08/12/2021 08:55 AM    HCT 47.0 08/12/2021 08:55 AM    MCV 87 08/12/2021 08:55 AM    MCH 29.3 08/12/2021 08:55 AM    MCHC 33.7 08/12/2021 08:55 AM    RDW 12.8 08/12/2021 08:55 AM     08/12/2021 08:55 AM     05/09/2019 10:25 AM    MPV 8.6 08/12/2021 08:55 AM     Lab Results   Component Value Date/Time    TSH 2.34 08/12/2021 08:55 AM     Lab Results   Component Value Date/Time    CHOL 132 08/26/2022 07:45 AM    CHOL 132 08/26/2022 12:00 AM    HDL 47 08/26/2022 07:45 AM    PSA 0.41 08/26/2022 12:00 AM    LABA1C 9.7 12/05/2022 03:55 PM       Assessment/Plan:      Diagnosis Orders   1. Type 2 diabetes mellitus without complication, unspecified whether long term insulin use (HCC)  POCT glycosylated hemoglobin (Hb A1C)    Hemoglobin A1C    HM DIABETES FOOT EXAM      2. Addiction [facility])  External Referral To Counseling Services      3. Obesity, unspecified classification, unspecified obesity type, unspecified whether serious comorbidity present  [address] ([address]) [address] [facility]      4.  Essential hypertension  CBC    Comprehensive Metabolic Panel      5. Hyperlipidemia, unspecified hyperlipidemia type  Lipid Panel      6. Morbid obesity ([address]          Long toenails:  - Patient notes difficulty with trimming toenails due to weight. I offer referral to podiatry for assistance, patient states he will consider this    DM:  - POC A1c 9.7% today in office  - Discussed with patient that this is considered uncontrolled diabetes  - He is significantly noncompliant with diet and exercise  - Increase metformin to 1000 mg twice daily  - Increase Ozempic to 1 mg once weekly   - Offered referral to diabetes education and/or nutritionist, patient declines  - Discussed bariatric surgery. Patient states he is not a candidate as he has checked with his insurance and this is not covered  - Discussed referral to counseling as patient states he has a food addiction. This was placed to [facility] in [place]. - Follow-up 3 months with labs prior including A1c    Bleeding of the gums:  - No signs of infection where patient self-extracted molar  - Discussed the importance of following up with a dentist as the remainder of the tooth will likely need to be extracted ASAP    Bilateral lower extremity edema:  - Chronic  - Patient has a severely high salt diet as he notes frequently eating chips and frozen dinners.   - Offer referral to dietitian, patient declines  - Printed education regarding low-salt diet supplied to patient  - Encouraged compression stockings nightly and lower extremity elevation  - No concern for DVT on exam at this time  - Notify office if symptoms worsen or persist, otherwise follow-up 3 months    Completed Refills   Requested Prescriptions     Signed Prescriptions Disp Refills    Foot Care Products (SOCK AID) MISC 1 each 0     Sig: Dispense 1    metFORMIN (GLUCOPHAGE-XR) 500 MG extended release tablet 360 tablet 3     Sig: Take 2 tablets by mouth 2 times daily (with meals)    Semaglutide, 1 MG/DOSE, (OZEMPIC, 1 MG/DOSE,) 4 MG/3ML SOPN 4 mL 3     Sig: Inject 1 mg into the skin once a week       Orders Placed This Encounter   Procedures    CBC     Standing Status:   Future     Standing Expiration Date:   12/5/2023    Comprehensive Metabolic Panel     Standing Status:   Future     Standing Expiration Date:   12/5/2023    Hemoglobin A1C     Standing Status:   Future     Standing Expiration Date:   12/5/2023    Lipid Panel     Standing Status:   Future     Standing Expiration Date:   12/5/2023    External Referral To Counseling Services     Referral Priority:   Routine     Referral Type:   Eval and Treat     Referral Reason:   Specialty Services Required     Referred to Provider:   [facility] Requested Specialty:   Clinical Counselor     Number of Visits Requested:   1    POCT glycosylated hemoglobin (Hb A1C)     DIABETES FOOT EXAM          Results for POC orders placed in visit on 12/05/22   POCT glycosylated hemoglobin (Hb A1C)   Result Value Ref Range    Hemoglobin A1C 9.7 %       Return in about 3 months (around 3/5/2023), or if symptoms worsen or fail to improve, for DM, BLE edema, and obesity f/u .     Electronically signed by GLENDY Oquendo CNP on 12/06/22 at 7:50 AM.

## 2022-12-05 NOTE — PATIENT INSTRUCTIONS
Increase metformin to 1000mg twice daily with meals   Increase ozempic to 1mg once weekly     SURVEY:    You may be receiving a survey from [email or web address] regarding your visit today. Please complete the survey to enable us to provide the highest quality of care to you and your family. If you cannot score us a very good on any question, please call the office to discuss how we could of made your experience a very good one. Thank you.

## 2022-12-05 NOTE — Clinical Note
Sasha, patient is requesting group therapy regarding obesity/weight loss. Can we check with [facility] in  as well as Dr. Ohara Ice office to inquire if there are any psych/counseling resources available for the patient?

## 2023-02-28 LAB
AVERAGE GLUCOSE: 240 MG/DL
HBA1C MFR BLD: 10 % (ref 4.2–5.6)

## 2023-03-01 LAB
ALBUMIN SERPL-MCNC: 4.3 G/DL (ref 3.5–5.2)
ALK PHOSPHATASE: 61 U/L (ref 40–121)
ALT SERPL-CCNC: 36 U/L (ref 5–50)
ANION GAP SERPL CALCULATED.3IONS-SCNC: 13 MEQ/L (ref 7–16)
AST SERPL-CCNC: 22 U/L (ref 9–50)
BILIRUB SERPL-MCNC: 0.5 MG/DL
BUN BLDV-MCNC: 11 MG/DL (ref 6–20)
CALCIUM SERPL-MCNC: 9.3 MG/DL (ref 8.5–10.5)
CHLORIDE BLD-SCNC: 98 MEQ/L (ref 95–107)
CHOLESTEROL/HDL RATIO: 2.9 RATIO
CHOLESTEROL: 123 MG/DL
CO2: 24 MEQ/L (ref 19–31)
CREAT SERPL-MCNC: 0.7 MG/DL (ref 0.8–1.4)
EGFR IF NONAFRICAN AMERICAN: 109 ML/MIN/1.73
GLUCOSE: 114 MG/DL (ref 70–99)
HCT VFR BLD CALC: 46.1 % (ref 40–51)
HDLC SERPL-MCNC: 42 MG/DL
HEMOGLOBIN: 15.9 G/DL (ref 13.5–17)
LDL CHOLESTEROL CALCULATED: 61 MG/DL
LDL/HDL RATIO: 1.5 RATIO
MCH RBC QN AUTO: 29.3 PG (ref 25–33)
MCHC RBC AUTO-ENTMCNC: 34.5 G/DL (ref 31–36)
MCV RBC AUTO: 85.1 FL (ref 80–99)
PDW BLD-RTO: 12.5 % (ref 11.5–15)
PLATELETS: 283 K/UL (ref 130–400)
PMV BLD AUTO: 10.8 FL (ref 9.3–13)
POTASSIUM SERPL-SCNC: 3.7 MEQ/L (ref 3.5–5.4)
RBC: 5.42 M/UL (ref 4.5–6.1)
SODIUM BLD-SCNC: 135 MEQ/L (ref 133–146)
TOTAL PROTEIN: 6.6 G/DL (ref 6.1–8.3)
TRIGL SERPL-MCNC: 102 MG/DL
VLDLC SERPL CALC-MCNC: 20 MG/DL
WBC: 10.9 K/UL (ref 3.5–11)

## 2023-03-06 ENCOUNTER — OFFICE VISIT (OUTPATIENT)
Dept: PRIMARY CARE CLINIC | Age: 53
End: 2023-03-06
Payer: COMMERCIAL

## 2023-03-06 VITALS
HEART RATE: 50 BPM | DIASTOLIC BLOOD PRESSURE: 60 MMHG | OXYGEN SATURATION: 95 % | HEIGHT: 71 IN | BODY MASS INDEX: 44.1 KG/M2 | SYSTOLIC BLOOD PRESSURE: 116 MMHG | WEIGHT: 315 LBS | RESPIRATION RATE: 18 BRPM | TEMPERATURE: 97.9 F

## 2023-03-06 DIAGNOSIS — E11.65 TYPE 2 DIABETES MELLITUS WITH HYPERGLYCEMIA, WITHOUT LONG-TERM CURRENT USE OF INSULIN (HCC): ICD-10-CM

## 2023-03-06 DIAGNOSIS — R60.0 BILATERAL LOWER EXTREMITY EDEMA: Primary | ICD-10-CM

## 2023-03-06 DIAGNOSIS — Z12.12 ENCOUNTER FOR COLORECTAL CANCER SCREENING: ICD-10-CM

## 2023-03-06 DIAGNOSIS — Z12.11 ENCOUNTER FOR COLORECTAL CANCER SCREENING: ICD-10-CM

## 2023-03-06 PROCEDURE — 99214 OFFICE O/P EST MOD 30 MIN: CPT | Performed by: NURSE PRACTITIONER

## 2023-03-06 PROCEDURE — 3046F HEMOGLOBIN A1C LEVEL >9.0%: CPT | Performed by: NURSE PRACTITIONER

## 2023-03-06 PROCEDURE — 3074F SYST BP LT 130 MM HG: CPT | Performed by: NURSE PRACTITIONER

## 2023-03-06 PROCEDURE — 3078F DIAST BP <80 MM HG: CPT | Performed by: NURSE PRACTITIONER

## 2023-03-06 RX ORDER — GLUCOSAMINE HCL/CHONDROITIN SU 500-400 MG
CAPSULE ORAL
Qty: 300 STRIP | Refills: 3 | Status: SHIPPED | OUTPATIENT
Start: 2023-03-06

## 2023-03-06 RX ORDER — GLUCOSAMINE HCL/CHONDROITIN SU 500-400 MG
CAPSULE ORAL
Qty: 300 STRIP | Refills: 0 | Status: SHIPPED | OUTPATIENT
Start: 2023-03-06 | End: 2023-03-06 | Stop reason: SDUPTHER

## 2023-03-06 ASSESSMENT — PATIENT HEALTH QUESTIONNAIRE - PHQ9
SUM OF ALL RESPONSES TO PHQ QUESTIONS 1-9: 0
10. IF YOU CHECKED OFF ANY PROBLEMS, HOW DIFFICULT HAVE THESE PROBLEMS MADE IT FOR YOU TO DO YOUR WORK, TAKE CARE OF THINGS AT HOME, OR GET ALONG WITH OTHER PEOPLE: 0
SUM OF ALL RESPONSES TO PHQ9 QUESTIONS 1 & 2: 0
8. MOVING OR SPEAKING SO SLOWLY THAT OTHER PEOPLE COULD HAVE NOTICED. OR THE OPPOSITE, BEING SO FIGETY OR RESTLESS THAT YOU HAVE BEEN MOVING AROUND A LOT MORE THAN USUAL: 0
9. THOUGHTS THAT YOU WOULD BE BETTER OFF DEAD, OR OF HURTING YOURSELF: 0
7. TROUBLE CONCENTRATING ON THINGS, SUCH AS READING THE NEWSPAPER OR WATCHING TELEVISION: 0
6. FEELING BAD ABOUT YOURSELF - OR THAT YOU ARE A FAILURE OR HAVE LET YOURSELF OR YOUR FAMILY DOWN: 0
3. TROUBLE FALLING OR STAYING ASLEEP: 0
4. FEELING TIRED OR HAVING LITTLE ENERGY: 0
SUM OF ALL RESPONSES TO PHQ QUESTIONS 1-9: 0
1. LITTLE INTEREST OR PLEASURE IN DOING THINGS: 0
2. FEELING DOWN, DEPRESSED OR HOPELESS: 0
5. POOR APPETITE OR OVEREATING: 0

## 2023-03-06 NOTE — PATIENT INSTRUCTIONS
SURVEY:    You may be receiving a survey from [employer] regarding your visit today. Please complete the survey to enable us to provide the highest quality of care to you and your family. If you cannot score us a very good on any question, please call the office to discuss how we could of made your experience a very good one. Thank you.

## 2023-03-06 NOTE — PROGRESS NOTES
Name: Indira Hope  : 1970         Chief Complaint:     Chief Complaint   Patient presents with    Edema     Still slightly swollen. Has a hard time getting compression stockings on and off. Obesity     Patient recently lost 24 lbs. With diet and walking. History of Present Illness:      Indira Hope is a 45 y.o.  male who presents with Edema (Still slightly swollen. Has a hard time getting compression stockings on and off. ) and Obesity (Patient recently lost 24 lbs. With diet and walking. )      HPI    DM:  Attending diabetes education: no  Diabetic diet/low carb diet compliance: he has adopted low carb and low sugar diet   Current exercise: 4 laps around the park almost every night   Frequency of glucose testing at home: None  Home blood sugar records: None  Glucometer at home: Yes, has not used in 6 months. He is in need of glucose strips. History of hypoglycemic episodes: no  Last eye exam:   Last diabetic foot check: 2022   reports that he has never smoked. He has never used smokeless tobacco.   Medication Therapy: ozempic 1mg per week, metformin 1000mg BID   Hemoglobin A1C (%)   Date Value   2023 10.0 (H)   2022 9.7   2022 6.9 (H)   2022 6.9   2022 6.2      BLE Edema:  Admits following low-salt diet, recently omitting chips and frozen dinners. He admits wearing compression stockings at nighttime. He states he does not wear these in the morning because they are difficult to don. He denies change in his lower extremity swelling from most recent appointment. Admits bilateral lower leg itching.      Past Medical History:     Past Medical History:   Diagnosis Date    Diabetes ([address] 2015    Hypertension     Obesity     Morbid    Venous insufficiency       Reviewed all health maintenance requirements and ordered appropriate tests  Health Maintenance Due   Topic Date Due    HIV screen  Never done    Hepatitis C screen  Never done    Hepatitis B vaccine ( 3 - Risk 3-dose series) Never done    DTaP/Tdap/Td vaccine (1 - Tdap) Never done    Colorectal Cancer Screen  Never done    Shingles vaccine (1 of 2) Never done    Diabetic retinal exam  06/15/2021    Flu vaccine (1) 08/01/2022       Past Surgical History:     No past surgical history on file. Medications:       Prior to Admission medications    Medication Sig Start Date End Date Taking? Authorizing Provider   blood glucose monitor strips Test 3 times a day & as needed for symptoms of irregular blood glucose. Dispense sufficient amount for indicated testing frequency plus additional to accommodate PRN testing needs. 3/6/23  Yes GLENDY Araujo CNP   empagliflozin (JARDIANCE) 10 MG tablet Take 1 tablet by mouth daily 3/6/23  Yes GLENDY Araujo CNP   Semaglutide, 2 MG/DOSE, 8 MG/3ML SOPN Inject 2 mg into the skin once a week 3/6/23  Yes Ace Gonzalez APRN - [address] ([address]) [id number] [facility] Dispense 1 12/5/22  Yes GLENDY Araujo CNP   metFORMIN (GLUCOPHAGE-XR) 500 MG extended release tablet Take 2 tablets by mouth 2 times daily (with meals) 12/5/22 11/30/23 Yes GLENDY Araujo CNP   hydroCHLOROthiazide (HYDRODIURIL) 25 MG tablet TAKE ONE TABLET BY MOUTH EVERY MORNING 9/26/22  Yes GLENDY Araujo CNP   blood glucose monitor kit and supplies Dispense sufficient amount for indicated testing frequency plus additional to accommodate PRN testing needs. Dispense all needed supplies to include: monitor, strips, lancing device, lancets, control solutions, alcohol swabs. 9/5/22  Yes GLENDY Araujo CNP   escitalopram (LEXAPRO) 10 MG tablet Take 1 tablet by mouth daily 5/24/22  Yes Fidencio Lassiter MD   lisinopril (PRINIVIL;ZESTRIL) 5 MG tablet TAKE 1 TABLET BY MOUTH DAILY 5/24/22  Yes Fidencio Lassiter MD   rosuvastatin (CRESTOR) 5 MG tablet Take 1 tablet by mouth daily 5/24/22  Yes Fidencio Lassiter MD        Allergies:       Patient has no known allergies.     Social History:     Tobacco:    reports that he has never smoked. He has never used smokeless tobacco.  Alcohol:      reports current alcohol use. Drug Use:  reports no history of drug use. Family History:     Family History   Problem Relation Age of Onset    High Blood Pressure Mother     High Cholesterol Mother     Emphysema Mother     Stroke Mother     Diabetes Father 48    Cancer Father     Other Sister         Smoker, chronic cough, SOB, 300+ lbs    Diabetes Paternal Uncle         Type 1    Cancer Paternal Uncle         Prostate    Heart Attack Paternal Grandfather     Diabetes Paternal Grandfather     Cataracts Paternal Grandfather        Review of Systems:     Positive and Negative as described in HPI    Review of Systems   Cardiovascular:  Positive for leg swelling. Physical Exam:   Vitals:  /60   Pulse 50   Temp 97.9 °F (36.6 °C)   Resp 18   Ht 5' 11\" (1.803 m)   Wt (!) 395 lb (179.2 kg)   SpO2 95%   BMI 55.09 kg/m²     Physical Exam  Constitutional:       General: He is not in acute distress. Appearance: Normal appearance. He is obese. He is not ill-appearing or toxic-appearing. Cardiovascular:      Rate and Rhythm: Normal rate and regular rhythm. Heart sounds: Normal heart sounds. No murmur heard. Pulmonary:      Effort: Pulmonary effort is normal. No respiratory distress. Breath sounds: Normal breath sounds. No stridor. No wheezing, rhonchi or rales. Musculoskeletal:      Comments: 1+ pretibial edema bilaterally. No calf tenderness, warmth, erythema bilaterally. Stasis changes noted to anterior lower legs bilaterally   Neurological:      Mental Status: He is alert. Psychiatric:         Mood and Affect: Mood normal.         Behavior: Behavior normal.         Thought Content:  Thought content normal.         Judgment: Judgment normal.       Data:     Lab Results   Component Value Date/Time     02/28/2023 04:05 PM    K 3.7 02/28/2023 04:05 PM    CL 98 02/28/2023 04:05 PM    CO2 24 02/28/2023 04:05 PM    BUN 11 02/28/2023 04:05 PM    CREATININE 0.70 02/28/2023 04:05 PM    GLUCOSE 114 02/28/2023 04:05 PM    PROT 6.6 02/28/2023 04:05 PM    LABALBU 4.3 02/28/2023 04:05 PM    BILITOT 0.5 02/28/2023 04:05 PM    ALKPHOS 61 02/28/2023 04:05 PM    ALKPHOS 52 05/26/2017 03:58 PM    AST 22 02/28/2023 04:05 PM    ALT 36 02/28/2023 04:05 PM     Lab Results   Component Value Date/Time    WBC 10.9 02/28/2023 04:05 PM    WBC 13.7 05/09/2019 10:25 AM    RBC 5.42 02/28/2023 04:05 PM    HGB 15.9 02/28/2023 04:05 PM    HCT 46.1 02/28/2023 04:05 PM    MCV 85.1 02/28/2023 04:05 PM    MCH 29.3 02/28/2023 04:05 PM    MCHC 34.5 02/28/2023 04:05 PM    RDW 12.5 02/28/2023 04:05 PM     02/28/2023 04:05 PM     05/09/2019 10:25 AM    MPV 10.8 02/28/2023 04:05 PM     Lab Results   Component Value Date/Time    TSH 2.34 08/12/2021 08:55 AM     Lab Results   Component Value Date/Time    CHOL 123 02/28/2023 04:05 PM    CHOL 132 08/26/2022 12:00 AM    HDL 42 02/28/2023 04:05 PM    PSA 0.41 08/26/2022 12:00 AM    LABA1C 10.0 02/28/2023 04:05 PM       Assessment/Plan:      Diagnosis Orders   1. Bilateral lower extremity edema  D-Dimer, Quantitative    Brain Natriuretic Peptide    TSH With Reflex Ft4    Urinalysis with Microscopic    Echocardiogram complete      2. Body mass index (BMI) 50.0-59.9, adult ([facility]        3. Type 2 diabetes mellitus with hyperglycemia, without long-term current use of insulin ([facility]  [facility] [place]      4.  Encounter for colorectal cancer screening  Fecal DNA Colorectal cancer screening (Cologuard)          Bilateral lower extremity edema:  - Encouraged low-salt diet, compression stockings during the day, remove QHS  - Lower extremity elevation  - Reviewed 2/28/2023 CMP, WNL renal function and liver enzymes  - Further evaluation UA, TFTs, echocardiogram, BNP, D-dimer    DM:  - Reviewed A1c 2/28/2023 of 10, discussed this is considered uncontrolled diabetes  - Counseled on diabetic diet and routine physical activity  - Continue metformin 1000 mg twice daily  - Increase ozempic from 1mg to 2 mg once weekly   - Initiate Jardiance 10 mg daily. Printed education regarding side effects supplied  - Patient is on the fence regarding diabetic education. Discussed benefits. He is agreeable to referral and will consider this  - Follow-up 3 months or sooner with any concerns  -- Complete diabetic eye exam    Wellness:  -- Counseled on colonoscopy versus cologuard. Patient agreeable to kit. Completed Refills   Requested Prescriptions     Signed Prescriptions Disp Refills    blood glucose monitor strips 300 strip 3     Sig: Test 3 times a day & as needed for symptoms of irregular blood glucose. Dispense sufficient amount for indicated testing frequency plus additional to accommodate PRN testing needs. empagliflozin (JARDIANCE) 10 MG tablet 30 tablet 5     Sig: Take 1 tablet by mouth daily    Semaglutide, 2 MG/DOSE, 8 MG/3ML SOPN 12 mL 3     Sig: Inject 2 mg into the skin once a week       Orders Placed This Encounter   Procedures    Fecal DNA Colorectal cancer screening (Cologuard)    D-Dimer, Quantitative     Standing Status:   Future     Standing Expiration Date:   3/6/2024    Brain Natriuretic Peptide     Standing Status:   Future     Standing Expiration Date:   3/6/2024    TSH With Reflex Ft4     Standing Status:   Future     Standing Expiration Date:   3/6/2024    Urinalysis with Microscopic     Standing Status:   Future     Standing Expiration Date:   3/6/2024     Order Specific Question:   SPECIFY(EX-CATH,MIDSTREAM,CYSTO,ETC)?      Answer:   midstream    [facility] [place]     Referral Priority:   Routine     Referral Type:   Eval and Treat     Referral Reason:   Specialty Services Required     Number of Visits Requested:   1    Echocardiogram complete     Standing Status:   Future     Standing Expiration Date:   5/5/2023     Order Specific Question:   Reason for exam:     Answer:   bilateral lower extremity edema        No results found for this visit on 03/06/23. Return in about 3 months (around 6/6/2023), or if symptoms worsen or fail to improve, for DM and BLE edema f/u .     Electronically signed by GLENDY Araujo CNP on 03/07/23 at 8:26 AM.

## 2023-03-20 LAB — NONINV COLON CA DNA+OCC BLD SCRN STL QL: NEGATIVE

## 2023-04-20 ENCOUNTER — OFFICE VISIT (OUTPATIENT)
Dept: PRIMARY CARE CLINIC | Age: 53
End: 2023-04-20
Payer: COMMERCIAL

## 2023-04-20 ENCOUNTER — HOSPITAL ENCOUNTER (OUTPATIENT)
Dept: CT IMAGING | Age: 53
Discharge: HOME | End: 2023-04-22

## 2023-04-20 VITALS
HEIGHT: 71 IN | HEART RATE: 68 BPM | BODY MASS INDEX: 44.1 KG/M2 | SYSTOLIC BLOOD PRESSURE: 148 MMHG | DIASTOLIC BLOOD PRESSURE: 72 MMHG | RESPIRATION RATE: 20 BRPM | WEIGHT: 315 LBS

## 2023-04-20 DIAGNOSIS — R10.2 SUPRAPUBIC PAIN: ICD-10-CM

## 2023-04-20 DIAGNOSIS — R14.0 BLOATING: ICD-10-CM

## 2023-04-20 DIAGNOSIS — R10.13 EPIGASTRIC PAIN: Primary | ICD-10-CM

## 2023-04-20 PROBLEM — F19.20 ADDICTION (HCC): Status: ACTIVE | Noted: 2023-04-20

## 2023-04-20 PROCEDURE — 3078F DIAST BP <80 MM HG: CPT | Performed by: STUDENT IN AN ORGANIZED HEALTH CARE EDUCATION/TRAINING PROGRAM

## 2023-04-20 PROCEDURE — 3077F SYST BP >= 140 MM HG: CPT | Performed by: STUDENT IN AN ORGANIZED HEALTH CARE EDUCATION/TRAINING PROGRAM

## 2023-04-20 PROCEDURE — 99214 OFFICE O/P EST MOD 30 MIN: CPT | Performed by: STUDENT IN AN ORGANIZED HEALTH CARE EDUCATION/TRAINING PROGRAM

## 2023-04-20 RX ORDER — PANTOPRAZOLE SODIUM 40 MG/1
40 TABLET, DELAYED RELEASE ORAL
Qty: 30 TABLET | Refills: 0 | Status: SHIPPED | OUTPATIENT
Start: 2023-04-20

## 2023-04-20 NOTE — PROGRESS NOTES
encouraged and discussed lifestyle modifications including diet and exercise and the patient was agreeable to making positive/beneficial changes to both to help improve their overall health. Discussed use, benefit, and side effects of prescribed medications. Barriers to medication compliance addressed. Patient given educational materials: see patient instructions. HM - HM items completed today as per orders. Outstanding HM items though not limited to immunizations were discussed with the patient today, including risks, benefits and alternatives. The patient will discuss these during the next appointment per their preference. If there are any worsening or concerning signs or symptoms, patient will report to the ED and/or contact [id number] for immediate evaluation. Teach back method was used. Subjective:    HPI  Chief Complaint   Patient presents with    Abdominal Pain     Abdominal Pain  Patient is here with c/o abdominal pain and burning after eating. He has been having this pain x 1 week. He reports feeling bloated and nauseated. The pain will be lower abdominal pain and sometime it will be upper abdominal pain. Denies any diarrhea. He recently had his Ozempic increased about 1 month ago. No known history of heartburn. No prior EGD/Colonoscopy noted. He had some intermittent chest wall pain, that has been short-lived and no repeat episodes.   Aggravating Factors - none identified  Relieving Factors/Treatment tried - Nexium, GasX, Tums  Overall change in status since onset - worsening    Health Maintenance -   Alcohol/Substance use History - None/Minimal    Tobacco Use      Smoking status: Never      Smokeless tobacco: Never    Family History   Problem Relation Age of Onset    High Blood Pressure Mother     High Cholesterol Mother     Emphysema Mother     Stroke Mother     Diabetes Father 48    Cancer Father     Other Sister         Smoker, chronic cough, SOB, 300+ lbs    Diabetes Paternal Uncle         Type 1

## 2023-04-24 LAB
ABSOLUTE BASO #: 0.02 K/UL (ref 0–0.2)
ABSOLUTE EOS #: 0.23 K/UL (ref 0–0.5)
ABSOLUTE LYMPH #: 2.39 K/UL (ref 1–4)
ABSOLUTE MONO #: 0.83 K/UL (ref 0.2–1)
ABSOLUTE NEUT #: 6.53 K/UL (ref 1.5–7.5)
ALBUMIN SERPL-MCNC: 3.9 G/DL (ref 3.5–5.2)
ALK PHOSPHATASE: 57 U/L (ref 40–121)
ALT SERPL-CCNC: 17 U/L (ref 5–50)
ANION GAP SERPL CALCULATED.3IONS-SCNC: 14 MEQ/L (ref 7–16)
AST SERPL-CCNC: 10 U/L (ref 9–50)
BASOPHILS RELATIVE PERCENT: 0.2 %
BILIRUB SERPL-MCNC: 0.4 MG/DL
BUN BLDV-MCNC: 12 MG/DL (ref 6–20)
C-REACTIVE PROTEIN: 1.2 MG/DL
CALCIUM SERPL-MCNC: 8.6 MG/DL (ref 8.5–10.5)
CHLORIDE BLD-SCNC: 98 MEQ/L (ref 95–107)
CO2: 25 MEQ/L (ref 19–31)
CREAT SERPL-MCNC: 0.7 MG/DL (ref 0.8–1.4)
EGFR IF NONAFRICAN AMERICAN: 109 ML/MIN/1.73
EOSINOPHILS RELATIVE PERCENT: 2.3 %
GLUCOSE: 136 MG/DL (ref 70–99)
HCT VFR BLD CALC: 44.4 % (ref 40–51)
HEMOGLOBIN: 15.3 G/DL (ref 13.5–17)
LIPASE: 35 U/L (ref 13–60)
LYMPHOCYTE %: 23.8 %
MCH RBC QN AUTO: 29.5 PG (ref 25–33)
MCHC RBC AUTO-ENTMCNC: 34.5 G/DL (ref 31–36)
MCV RBC AUTO: 85.7 FL (ref 80–99)
MONOCYTES # BLD: 8.3 %
NEUTROPHILS RELATIVE PERCENT: 64.8 %
PDW BLD-RTO: 12.2 % (ref 11.5–15)
PLATELETS: 247 K/UL (ref 130–400)
PMV BLD AUTO: 10.9 FL (ref 9.3–13)
POTASSIUM SERPL-SCNC: 3.8 MEQ/L (ref 3.5–5.4)
RBC: 5.18 M/UL (ref 4.5–6.1)
SODIUM BLD-SCNC: 137 MEQ/L (ref 133–146)
TOTAL PROTEIN: 6.6 G/DL (ref 6.1–8.3)
WBC: 10.1 K/UL (ref 3.5–11)

## 2023-04-27 ENCOUNTER — OFFICE VISIT (OUTPATIENT)
Dept: PRIMARY CARE CLINIC | Age: 53
End: 2023-04-27
Payer: COMMERCIAL

## 2023-04-27 VITALS
WEIGHT: 315 LBS | HEIGHT: 71 IN | BODY MASS INDEX: 44.1 KG/M2 | RESPIRATION RATE: 16 BRPM | DIASTOLIC BLOOD PRESSURE: 80 MMHG | SYSTOLIC BLOOD PRESSURE: 138 MMHG | HEART RATE: 88 BPM

## 2023-04-27 DIAGNOSIS — E11.65 TYPE 2 DIABETES MELLITUS WITH HYPERGLYCEMIA, WITHOUT LONG-TERM CURRENT USE OF INSULIN (HCC): Primary | ICD-10-CM

## 2023-04-27 PROCEDURE — 99214 OFFICE O/P EST MOD 30 MIN: CPT | Performed by: STUDENT IN AN ORGANIZED HEALTH CARE EDUCATION/TRAINING PROGRAM

## 2023-04-27 PROCEDURE — 3079F DIAST BP 80-89 MM HG: CPT | Performed by: STUDENT IN AN ORGANIZED HEALTH CARE EDUCATION/TRAINING PROGRAM

## 2023-04-27 PROCEDURE — 3046F HEMOGLOBIN A1C LEVEL >9.0%: CPT | Performed by: STUDENT IN AN ORGANIZED HEALTH CARE EDUCATION/TRAINING PROGRAM

## 2023-04-27 PROCEDURE — 3075F SYST BP GE 130 - 139MM HG: CPT | Performed by: STUDENT IN AN ORGANIZED HEALTH CARE EDUCATION/TRAINING PROGRAM

## 2023-04-27 NOTE — PATIENT INSTRUCTIONS
SURVEY:    You may be receiving a survey from [employer] regarding your visit today. Please complete the survey to enable us to provide the highest quality of care to you and your family. If you cannot score us a very good on any question, please call the office to discuss how we could have made your experience a very good one. Thank you.

## 2023-04-27 NOTE — PROGRESS NOTES
[id number] for immediate evaluation. Teach back method was used. Subjective:    HPI  Chief Complaint   Patient presents with    Abdominal Pain     Patient is here for a one week follow up for epigastric pain. Nature of Onset and Mechanism -  gradual onset at the time  Location - mid-epigastric/burning  Duration - n/a  Characteristics/Radiation/Quality - history of a burning sensation. The patient's recent A1c was reviewed. He is compliant with all of his other medications. Severity (0-10) - 0/10  Aggravating Factors - Ozempic/the higher dose provided him symptoms. Relieving Factors/Treatment tried -  OTC medications and decreasing Ozempic  Overall change in status since onset - improving/completely resolved. Health Maintenance -   Alcohol/Substance use History - None/Minimal    Tobacco Use      Smoking status: Never      Smokeless tobacco: Never    Family History   Problem Relation Age of Onset    High Blood Pressure Mother     High Cholesterol Mother     Emphysema Mother     Stroke Mother     Diabetes Father 48    Cancer Father     Other Sister         Smoker, chronic cough, SOB, 300+ lbs    Diabetes Paternal Uncle         Type 1    Cancer Paternal Uncle         Prostate    Heart Attack Paternal Grandfather     Diabetes Paternal Grandfather     Cataracts Paternal Grandfather        [facility] 3/6/2023 9/2/2022 8/12/2021 7/13/2021 12/3/2020 3/5/2019 6/3/2017   PHQ2 Score 0 0 2 0 0 0 2   PHQ9 Score 0 0 2 0 0 0 2     Interpretation of Total Score Depression Severity: 1-4 = Minimal depression, 5-9 = Mild depression, 10-14 = Moderate depression, 15-19 = Moderately severe depression, 20-27 = Severe depression    Review of Systems  Constitutional: Negative for activity change, appetite change, chills, diaphoresis, fatigue, fever and unexpected weight change. HENT: Negative for sinus pressure, sinus pain, sore throat and trouble swallowing. Respiratory: Negative for cough, shortness of breath and wheezing.

## 2023-05-17 DIAGNOSIS — R10.13 EPIGASTRIC PAIN: ICD-10-CM

## 2023-05-17 RX ORDER — PANTOPRAZOLE SODIUM 40 MG/1
TABLET, DELAYED RELEASE ORAL
Qty: 30 TABLET | Refills: 2 | Status: SHIPPED | OUTPATIENT
Start: 2023-05-17

## 2023-06-19 DIAGNOSIS — I10 ESSENTIAL HYPERTENSION: ICD-10-CM

## 2023-06-19 RX ORDER — ROSUVASTATIN CALCIUM 5 MG/1
5 TABLET, COATED ORAL DAILY
Qty: 90 TABLET | Refills: 0 | Status: SHIPPED | OUTPATIENT
Start: 2023-06-19

## 2023-06-19 RX ORDER — LISINOPRIL 5 MG/1
TABLET ORAL
Qty: 90 TABLET | Refills: 0 | Status: SHIPPED | OUTPATIENT
Start: 2023-06-19

## 2023-06-30 ENCOUNTER — OFFICE VISIT (OUTPATIENT)
Dept: PRIMARY CARE CLINIC | Age: 53
End: 2023-06-30
Payer: COMMERCIAL

## 2023-06-30 VITALS
DIASTOLIC BLOOD PRESSURE: 60 MMHG | HEART RATE: 72 BPM | SYSTOLIC BLOOD PRESSURE: 138 MMHG | HEIGHT: 71 IN | BODY MASS INDEX: 44.1 KG/M2 | WEIGHT: 315 LBS

## 2023-06-30 DIAGNOSIS — E11.65 TYPE 2 DIABETES MELLITUS WITH HYPERGLYCEMIA, WITHOUT LONG-TERM CURRENT USE OF INSULIN (HCC): Primary | ICD-10-CM

## 2023-06-30 DIAGNOSIS — I10 ESSENTIAL HYPERTENSION: ICD-10-CM

## 2023-06-30 PROBLEM — F19.20 ADDICTION (HCC): Status: RESOLVED | Noted: 2023-04-20 | Resolved: 2023-06-30

## 2023-06-30 PROCEDURE — 99214 OFFICE O/P EST MOD 30 MIN: CPT | Performed by: STUDENT IN AN ORGANIZED HEALTH CARE EDUCATION/TRAINING PROGRAM

## 2023-06-30 PROCEDURE — 3046F HEMOGLOBIN A1C LEVEL >9.0%: CPT | Performed by: STUDENT IN AN ORGANIZED HEALTH CARE EDUCATION/TRAINING PROGRAM

## 2023-06-30 PROCEDURE — 3078F DIAST BP <80 MM HG: CPT | Performed by: STUDENT IN AN ORGANIZED HEALTH CARE EDUCATION/TRAINING PROGRAM

## 2023-06-30 PROCEDURE — 3075F SYST BP GE 130 - 139MM HG: CPT | Performed by: STUDENT IN AN ORGANIZED HEALTH CARE EDUCATION/TRAINING PROGRAM

## 2023-07-03 LAB
ABSOLUTE BASO #: 0.07 K/UL (ref 0–0.2)
ABSOLUTE EOS #: 0.12 K/UL (ref 0–0.5)
ABSOLUTE LYMPH #: 2.3 K/UL (ref 1–4)
ABSOLUTE MONO #: 0.7 K/UL (ref 0.2–1)
ABSOLUTE NEUT #: 7.74 K/UL (ref 1.5–7.5)
ALBUMIN SERPL-MCNC: 4 G/DL (ref 3.5–5.2)
ALK PHOSPHATASE: 56 U/L (ref 40–121)
ALT SERPL-CCNC: 21 U/L (ref 5–50)
ANION GAP SERPL CALCULATED.3IONS-SCNC: 12 MEQ/L (ref 7–16)
AST SERPL-CCNC: 13 U/L (ref 9–50)
AVERAGE GLUCOSE: 143 MG/DL
BASOPHILS RELATIVE PERCENT: 0.6 %
BILIRUB SERPL-MCNC: 0.4 MG/DL
BUN BLDV-MCNC: 10 MG/DL (ref 6–20)
CALCIUM SERPL-MCNC: 8.8 MG/DL (ref 8.5–10.5)
CHLORIDE BLD-SCNC: 99 MEQ/L (ref 95–107)
CO2: 25 MEQ/L (ref 19–31)
CREAT SERPL-MCNC: 0.66 MG/DL (ref 0.8–1.4)
EGFR IF NONAFRICAN AMERICAN: 111 ML/MIN/1.73
EOSINOPHILS RELATIVE PERCENT: 1.1 %
GLUCOSE: 110 MG/DL (ref 70–99)
HBA1C MFR BLD: 6.6 % (ref 4.2–5.6)
HCT VFR BLD CALC: 44.7 % (ref 40–51)
HEMOGLOBIN: 15.8 G/DL (ref 13.5–17)
LYMPHOCYTE %: 20.9 %
MCH RBC QN AUTO: 30.2 PG (ref 25–33)
MCHC RBC AUTO-ENTMCNC: 35.3 G/DL (ref 31–36)
MCV RBC AUTO: 85.3 FL (ref 80–99)
MONOCYTES # BLD: 6.4 %
NEUTROPHILS RELATIVE PERCENT: 70.5 %
PDW BLD-RTO: 12.6 % (ref 11.5–15)
PLATELETS: 224 K/UL (ref 130–400)
PMV BLD AUTO: 10.9 FL (ref 9.3–13)
POTASSIUM SERPL-SCNC: 4.3 MEQ/L (ref 3.5–5.4)
RBC: 5.24 M/UL (ref 4.5–6.1)
SODIUM BLD-SCNC: 136 MEQ/L (ref 133–146)
TOTAL PROTEIN: 6.5 G/DL (ref 6.1–8.3)
WBC: 11 K/UL (ref 3.5–11)

## 2023-09-21 DIAGNOSIS — I10 ESSENTIAL HYPERTENSION: ICD-10-CM

## 2023-09-21 RX ORDER — LISINOPRIL 5 MG/1
TABLET ORAL
Qty: 90 TABLET | Refills: 0 | Status: SHIPPED | OUTPATIENT
Start: 2023-09-21

## 2023-09-21 RX ORDER — ROSUVASTATIN CALCIUM 5 MG/1
5 TABLET, COATED ORAL DAILY
Qty: 90 TABLET | Refills: 0 | Status: SHIPPED | OUTPATIENT
Start: 2023-09-21

## 2023-10-06 ENCOUNTER — OFFICE VISIT (OUTPATIENT)
Dept: PRIMARY CARE CLINIC | Age: 53
End: 2023-10-06
Payer: COMMERCIAL

## 2023-10-06 VITALS
BODY MASS INDEX: 44.1 KG/M2 | HEIGHT: 71 IN | OXYGEN SATURATION: 95 % | DIASTOLIC BLOOD PRESSURE: 68 MMHG | HEART RATE: 81 BPM | SYSTOLIC BLOOD PRESSURE: 124 MMHG | WEIGHT: 315 LBS

## 2023-10-06 DIAGNOSIS — E11.65 TYPE 2 DIABETES MELLITUS WITH HYPERGLYCEMIA, WITHOUT LONG-TERM CURRENT USE OF INSULIN (HCC): ICD-10-CM

## 2023-10-06 DIAGNOSIS — I10 ESSENTIAL HYPERTENSION: Primary | ICD-10-CM

## 2023-10-06 DIAGNOSIS — G47.00 INSOMNIA, UNSPECIFIED TYPE: ICD-10-CM

## 2023-10-06 PROCEDURE — 3078F DIAST BP <80 MM HG: CPT | Performed by: STUDENT IN AN ORGANIZED HEALTH CARE EDUCATION/TRAINING PROGRAM

## 2023-10-06 PROCEDURE — 3044F HG A1C LEVEL LT 7.0%: CPT | Performed by: STUDENT IN AN ORGANIZED HEALTH CARE EDUCATION/TRAINING PROGRAM

## 2023-10-06 PROCEDURE — 3074F SYST BP LT 130 MM HG: CPT | Performed by: STUDENT IN AN ORGANIZED HEALTH CARE EDUCATION/TRAINING PROGRAM

## 2023-10-06 PROCEDURE — 99214 OFFICE O/P EST MOD 30 MIN: CPT | Performed by: STUDENT IN AN ORGANIZED HEALTH CARE EDUCATION/TRAINING PROGRAM

## 2023-10-06 RX ORDER — TRAZODONE HYDROCHLORIDE 50 MG/1
TABLET ORAL
Qty: 90 TABLET | Refills: 0 | Status: SHIPPED | OUTPATIENT
Start: 2023-10-06

## 2023-10-06 SDOH — ECONOMIC STABILITY: INCOME INSECURITY: HOW HARD IS IT FOR YOU TO PAY FOR THE VERY BASICS LIKE FOOD, HOUSING, MEDICAL CARE, AND HEATING?: NOT HARD AT ALL

## 2023-10-06 SDOH — ECONOMIC STABILITY: FOOD INSECURITY: WITHIN THE PAST 12 MONTHS, THE FOOD YOU BOUGHT JUST DIDN'T LAST AND YOU DIDN'T HAVE MONEY TO GET MORE.: NEVER TRUE

## 2023-10-06 SDOH — ECONOMIC STABILITY: FOOD INSECURITY: WITHIN THE PAST 12 MONTHS, YOU WORRIED THAT YOUR FOOD WOULD RUN OUT BEFORE YOU GOT MONEY TO BUY MORE.: NEVER TRUE

## 2023-11-19 DIAGNOSIS — G47.00 INSOMNIA, UNSPECIFIED TYPE: ICD-10-CM

## 2023-11-20 RX ORDER — TRAZODONE HYDROCHLORIDE 50 MG/1
TABLET ORAL
Qty: 90 TABLET | Refills: 0 | Status: SHIPPED | OUTPATIENT
Start: 2023-11-20

## 2024-05-08 DIAGNOSIS — I10 ESSENTIAL HYPERTENSION: ICD-10-CM

## 2024-05-08 DIAGNOSIS — R60.9 WATER RETENTION: ICD-10-CM

## 2024-05-08 RX ORDER — LISINOPRIL 5 MG/1
5 TABLET ORAL DAILY
Qty: 90 TABLET | Refills: 0 | Status: SHIPPED | OUTPATIENT
Start: 2024-05-08

## 2024-05-08 RX ORDER — ROSUVASTATIN CALCIUM 5 MG/1
5 TABLET, COATED ORAL DAILY
Qty: 90 TABLET | Refills: 0 | Status: SHIPPED | OUTPATIENT
Start: 2024-05-08

## 2024-05-08 RX ORDER — HYDROCHLOROTHIAZIDE 25 MG/1
25 TABLET ORAL EVERY MORNING
Qty: 90 TABLET | Refills: 0 | Status: SHIPPED | OUTPATIENT
Start: 2024-05-08

## 2024-07-01 ASSESSMENT — PATIENT HEALTH QUESTIONNAIRE - PHQ9
SUM OF ALL RESPONSES TO PHQ QUESTIONS 1-9: 11
4. FEELING TIRED OR HAVING LITTLE ENERGY: SEVERAL DAYS
SUM OF ALL RESPONSES TO PHQ9 QUESTIONS 1 & 2: 3
5. POOR APPETITE OR OVEREATING: NEARLY EVERY DAY
8. MOVING OR SPEAKING SO SLOWLY THAT OTHER PEOPLE COULD HAVE NOTICED. OR THE OPPOSITE, BEING SO FIGETY OR RESTLESS THAT YOU HAVE BEEN MOVING AROUND A LOT MORE THAN USUAL: NOT AT ALL
5. POOR APPETITE OR OVEREATING: NEARLY EVERY DAY
6. FEELING BAD ABOUT YOURSELF - OR THAT YOU ARE A FAILURE OR HAVE LET YOURSELF OR YOUR FAMILY DOWN: NEARLY EVERY DAY
1. LITTLE INTEREST OR PLEASURE IN DOING THINGS: MORE THAN HALF THE DAYS
SUM OF ALL RESPONSES TO PHQ QUESTIONS 1-9: 11
6. FEELING BAD ABOUT YOURSELF - OR THAT YOU ARE A FAILURE OR HAVE LET YOURSELF OR YOUR FAMILY DOWN: NEARLY EVERY DAY
9. THOUGHTS THAT YOU WOULD BE BETTER OFF DEAD, OR OF HURTING YOURSELF: NOT AT ALL
10. IF YOU CHECKED OFF ANY PROBLEMS, HOW DIFFICULT HAVE THESE PROBLEMS MADE IT FOR YOU TO DO YOUR WORK, TAKE CARE OF THINGS AT HOME, OR GET ALONG WITH OTHER PEOPLE: SOMEWHAT DIFFICULT
4. FEELING TIRED OR HAVING LITTLE ENERGY: SEVERAL DAYS
SUM OF ALL RESPONSES TO PHQ QUESTIONS 1-9: 11
9. THOUGHTS THAT YOU WOULD BE BETTER OFF DEAD, OR OF HURTING YOURSELF: NOT AT ALL
SUM OF ALL RESPONSES TO PHQ QUESTIONS 1-9: 11
7. TROUBLE CONCENTRATING ON THINGS, SUCH AS READING THE NEWSPAPER OR WATCHING TELEVISION: NOT AT ALL
2. FEELING DOWN, DEPRESSED OR HOPELESS: SEVERAL DAYS
10. IF YOU CHECKED OFF ANY PROBLEMS, HOW DIFFICULT HAVE THESE PROBLEMS MADE IT FOR YOU TO DO YOUR WORK, TAKE CARE OF THINGS AT HOME, OR GET ALONG WITH OTHER PEOPLE: SOMEWHAT DIFFICULT
2. FEELING DOWN, DEPRESSED OR HOPELESS: SEVERAL DAYS
SUM OF ALL RESPONSES TO PHQ9 QUESTIONS 1 & 2: 3
SUM OF ALL RESPONSES TO PHQ QUESTIONS 1-9: 11
8. MOVING OR SPEAKING SO SLOWLY THAT OTHER PEOPLE COULD HAVE NOTICED. OR THE OPPOSITE - BEING SO FIDGETY OR RESTLESS THAT YOU HAVE BEEN MOVING AROUND A LOT MORE THAN USUAL: NOT AT ALL
3. TROUBLE FALLING OR STAYING ASLEEP: SEVERAL DAYS
7. TROUBLE CONCENTRATING ON THINGS, SUCH AS READING THE NEWSPAPER OR WATCHING TELEVISION: NOT AT ALL
1. LITTLE INTEREST OR PLEASURE IN DOING THINGS: MORE THAN HALF THE DAYS
3. TROUBLE FALLING OR STAYING ASLEEP: SEVERAL DAYS

## 2024-07-02 ENCOUNTER — OFFICE VISIT (OUTPATIENT)
Dept: PRIMARY CARE CLINIC | Age: 54
End: 2024-07-02
Payer: COMMERCIAL

## 2024-07-02 VITALS
WEIGHT: 315 LBS | SYSTOLIC BLOOD PRESSURE: 122 MMHG | DIASTOLIC BLOOD PRESSURE: 84 MMHG | HEIGHT: 71 IN | OXYGEN SATURATION: 97 % | HEART RATE: 94 BPM | BODY MASS INDEX: 44.1 KG/M2

## 2024-07-02 DIAGNOSIS — Z12.5 SCREENING PSA (PROSTATE SPECIFIC ANTIGEN): ICD-10-CM

## 2024-07-02 DIAGNOSIS — I10 ESSENTIAL HYPERTENSION: ICD-10-CM

## 2024-07-02 DIAGNOSIS — E78.5 DYSLIPIDEMIA: ICD-10-CM

## 2024-07-02 DIAGNOSIS — E11.65 TYPE 2 DIABETES MELLITUS WITH HYPERGLYCEMIA, WITHOUT LONG-TERM CURRENT USE OF INSULIN (HCC): Primary | ICD-10-CM

## 2024-07-02 PROCEDURE — 99214 OFFICE O/P EST MOD 30 MIN: CPT | Performed by: STUDENT IN AN ORGANIZED HEALTH CARE EDUCATION/TRAINING PROGRAM

## 2024-07-02 PROCEDURE — 3079F DIAST BP 80-89 MM HG: CPT | Performed by: STUDENT IN AN ORGANIZED HEALTH CARE EDUCATION/TRAINING PROGRAM

## 2024-07-02 PROCEDURE — 3074F SYST BP LT 130 MM HG: CPT | Performed by: STUDENT IN AN ORGANIZED HEALTH CARE EDUCATION/TRAINING PROGRAM

## 2024-07-02 RX ORDER — SEMAGLUTIDE 1.34 MG/ML
0.25 INJECTION, SOLUTION SUBCUTANEOUS WEEKLY
Qty: 4 ADJUSTABLE DOSE PRE-FILLED PEN SYRINGE | Refills: 4 | Status: SHIPPED | OUTPATIENT
Start: 2024-07-02

## 2024-07-02 NOTE — PROGRESS NOTES
02/28/2023    ALT 21 07/03/2023    AST 13 07/03/2023     07/03/2023    K 4.3 07/03/2023    CL 99 07/03/2023    CREATININE 0.66 (L) 07/03/2023    BUN 10 07/03/2023    CO2 25 07/03/2023    TSH 2.34 08/12/2021    PSA 0.41 08/26/2022    LABA1C 6.6 (H) 07/03/2023     Lab Results   Component Value Date    CALCIUM 8.8 07/03/2023     No results found for: \"LDLDIRECT\"    Please note that this chart was generated using voice recognition Dragon dictation software. Although every effort was made to ensure the accuracy of this automated transcription, some errors in transcription may have occurred.    Electronically signed by Dr. Tom Mukherjee MD on 7/2/2024 at 3:14 PM

## 2024-07-08 LAB
ALBUMIN: 3.8 G/DL (ref 3.5–5.2)
ALK PHOSPHATASE: 57 U/L (ref 40–123)
ALT SERPL-CCNC: 19 U/L (ref 5–50)
ANION GAP SERPL CALCULATED.3IONS-SCNC: 10 MEQ/L (ref 7–16)
AST SERPL-CCNC: 11 U/L (ref 9–50)
BASOPHILS ABSOLUTE: 0.06 K/UL (ref 0–0.2)
BASOPHILS RELATIVE PERCENT: 0.7 %
BILIRUB SERPL-MCNC: 0.3 MG/DL
BUN BLDV-MCNC: 12 MG/DL (ref 6–20)
CALCIUM SERPL-MCNC: 8.8 MG/DL (ref 8.5–10.5)
CHLORIDE BLD-SCNC: 101 MEQ/L (ref 95–107)
CHOLESTEROL, TOTAL: 166 MG/DL
CHOLESTEROL/HDL RATIO: 3.3 RATIO
CO2: 24 MEQ/L (ref 19–31)
CREAT SERPL-MCNC: 0.57 MG/DL (ref 0.8–1.4)
EGFR IF NONAFRICAN AMERICAN: 115 ML/MIN/1.73
EOSINOPHILS ABSOLUTE: 0.14 K/UL (ref 0–0.5)
EOSINOPHILS RELATIVE PERCENT: 1.5 %
ESTIMATED AVERAGE GLUCOSE: 194 MG/DL
GLUCOSE: 281 MG/DL (ref 70–99)
HBA1C MFR BLD: 8.4 % (ref 4.2–5.6)
HCT VFR BLD CALC: 44.1 % (ref 40–51)
HDLC SERPL-MCNC: 51 MG/DL
HEMOGLOBIN: 14.9 G/DL (ref 13.5–17)
IMMATURE GRANS (ABS): 0.05
IMMATURE GRANULOCYTES %: 0.5 %
LDL CHOLESTEROL: 92 MG/DL
LDL/HDL RATIO: 1.8 RATIO
LYMPHOCYTES ABSOLUTE: 2.11 K/UL (ref 1–4)
LYMPHOCYTES RELATIVE PERCENT: 22.9 %
MCH RBC QN AUTO: 30 PG (ref 25–33)
MCHC RBC AUTO-ENTMCNC: 33.8 G/DL (ref 31–36)
MCV RBC AUTO: 88.9 FL (ref 80–99)
MICROALBUMIN/CREAT 24H UR: <1.2 MG/DL
MONOCYTES ABSOLUTE: 0.62 K/UL (ref 0.2–1)
MONOCYTES RELATIVE PERCENT: 6.7 %
NEUTROPHILS ABSOLUTE: 6.24 K/UL (ref 1.5–7.5)
NEUTROPHILS RELATIVE PERCENT: 67.7 %
PDW BLD-RTO: 12.1 % (ref 11.5–15)
PLATELET # BLD: 239 K/UL (ref 130–400)
PMV BLD AUTO: 10.5 FL (ref 9.3–13)
POTASSIUM SERPL-SCNC: 4.6 MEQ/L (ref 3.5–5.4)
PSA, ULTRASENSITIVE: 0.29 NG/ML
RBC # BLD: 4.96 M/UL (ref 4.5–6.1)
SODIUM BLD-SCNC: 135 MEQ/L (ref 133–146)
TOTAL PROTEIN: 6.5 G/DL (ref 6.1–8.3)
TRIGL SERPL-MCNC: 117 MG/DL
VLDLC SERPL CALC-MCNC: 23 MG/DL
WBC # BLD: 9.2 K/UL (ref 3.5–11)

## 2024-08-01 PROBLEM — Z12.5 SCREENING PSA (PROSTATE SPECIFIC ANTIGEN): Status: RESOLVED | Noted: 2024-07-02 | Resolved: 2024-08-01

## 2024-08-06 DIAGNOSIS — I10 ESSENTIAL HYPERTENSION: ICD-10-CM

## 2024-08-06 DIAGNOSIS — R60.9 WATER RETENTION: ICD-10-CM

## 2024-08-06 RX ORDER — ROSUVASTATIN CALCIUM 5 MG/1
5 TABLET, COATED ORAL DAILY
Qty: 90 TABLET | Refills: 0 | Status: SHIPPED | OUTPATIENT
Start: 2024-08-06

## 2024-08-06 RX ORDER — HYDROCHLOROTHIAZIDE 25 MG/1
25 TABLET ORAL EVERY MORNING
Qty: 90 TABLET | Refills: 0 | Status: SHIPPED | OUTPATIENT
Start: 2024-08-06

## 2024-08-06 RX ORDER — LISINOPRIL 5 MG/1
5 TABLET ORAL DAILY
Qty: 90 TABLET | Refills: 0 | Status: SHIPPED | OUTPATIENT
Start: 2024-08-06

## 2024-09-10 ENCOUNTER — PATIENT MESSAGE (OUTPATIENT)
Dept: PRIMARY CARE CLINIC | Age: 54
End: 2024-09-10

## 2024-11-05 DIAGNOSIS — R60.9 WATER RETENTION: ICD-10-CM

## 2024-11-05 DIAGNOSIS — I10 ESSENTIAL HYPERTENSION: ICD-10-CM

## 2024-11-05 RX ORDER — LISINOPRIL 5 MG/1
5 TABLET ORAL DAILY
Qty: 90 TABLET | Refills: 0 | Status: SHIPPED | OUTPATIENT
Start: 2024-11-05

## 2024-11-05 RX ORDER — ROSUVASTATIN CALCIUM 5 MG/1
5 TABLET, COATED ORAL DAILY
Qty: 90 TABLET | Refills: 0 | Status: SHIPPED | OUTPATIENT
Start: 2024-11-05

## 2024-11-05 RX ORDER — HYDROCHLOROTHIAZIDE 25 MG/1
25 TABLET ORAL EVERY MORNING
Qty: 90 TABLET | Refills: 0 | Status: SHIPPED | OUTPATIENT
Start: 2024-11-05

## 2025-02-02 DIAGNOSIS — R60.9 WATER RETENTION: ICD-10-CM

## 2025-02-02 DIAGNOSIS — I10 ESSENTIAL HYPERTENSION: ICD-10-CM

## 2025-02-03 RX ORDER — LISINOPRIL 5 MG/1
5 TABLET ORAL DAILY
Qty: 90 TABLET | Refills: 0 | Status: SHIPPED | OUTPATIENT
Start: 2025-02-03

## 2025-02-03 RX ORDER — ROSUVASTATIN CALCIUM 5 MG/1
5 TABLET, COATED ORAL DAILY
Qty: 90 TABLET | Refills: 0 | Status: SHIPPED | OUTPATIENT
Start: 2025-02-03

## 2025-02-03 RX ORDER — HYDROCHLOROTHIAZIDE 25 MG/1
25 TABLET ORAL EVERY MORNING
Qty: 90 TABLET | Refills: 0 | Status: SHIPPED | OUTPATIENT
Start: 2025-02-03

## 2025-05-13 DIAGNOSIS — R60.9 WATER RETENTION: ICD-10-CM

## 2025-05-13 DIAGNOSIS — I10 ESSENTIAL HYPERTENSION: ICD-10-CM

## 2025-05-13 RX ORDER — LISINOPRIL 5 MG/1
5 TABLET ORAL DAILY
Qty: 90 TABLET | Refills: 0 | Status: SHIPPED | OUTPATIENT
Start: 2025-05-13

## 2025-05-13 RX ORDER — HYDROCHLOROTHIAZIDE 25 MG/1
25 TABLET ORAL EVERY MORNING
Qty: 90 TABLET | Refills: 0 | Status: SHIPPED | OUTPATIENT
Start: 2025-05-13

## 2025-05-13 RX ORDER — ROSUVASTATIN CALCIUM 5 MG/1
5 TABLET, COATED ORAL DAILY
Qty: 90 TABLET | Refills: 0 | Status: SHIPPED | OUTPATIENT
Start: 2025-05-13